# Patient Record
Sex: MALE | Race: WHITE | NOT HISPANIC OR LATINO
[De-identification: names, ages, dates, MRNs, and addresses within clinical notes are randomized per-mention and may not be internally consistent; named-entity substitution may affect disease eponyms.]

---

## 2017-04-30 ENCOUNTER — TRANSCRIPTION ENCOUNTER (OUTPATIENT)
Age: 37
End: 2017-04-30

## 2017-05-01 ENCOUNTER — APPOINTMENT (OUTPATIENT)
Dept: ORTHOPEDIC SURGERY | Facility: CLINIC | Age: 37
End: 2017-05-01

## 2017-05-01 VITALS
SYSTOLIC BLOOD PRESSURE: 146 MMHG | BODY MASS INDEX: 34.07 KG/M2 | DIASTOLIC BLOOD PRESSURE: 98 MMHG | HEIGHT: 69 IN | HEART RATE: 98 BPM | WEIGHT: 230 LBS

## 2017-05-01 DIAGNOSIS — Z78.9 OTHER SPECIFIED HEALTH STATUS: ICD-10-CM

## 2017-05-01 DIAGNOSIS — L03.114 CELLULITIS OF LEFT UPPER LIMB: ICD-10-CM

## 2017-05-01 DIAGNOSIS — Z80.3 FAMILY HISTORY OF MALIGNANT NEOPLASM OF BREAST: ICD-10-CM

## 2017-05-03 PROBLEM — Z78.9 CURRENT NON-SMOKER: Status: ACTIVE | Noted: 2017-05-01

## 2017-05-03 PROBLEM — Z78.9 DOES NOT USE ILLICIT DRUGS: Status: ACTIVE | Noted: 2017-05-01

## 2017-05-03 PROBLEM — Z78.9 RARELY CONSUMES ALCOHOL: Status: ACTIVE | Noted: 2017-05-01

## 2017-05-03 PROBLEM — Z80.3 FAMILY HISTORY OF MALIGNANT NEOPLASM OF BREAST: Status: ACTIVE | Noted: 2017-05-01

## 2017-06-06 ENCOUNTER — TRANSCRIPTION ENCOUNTER (OUTPATIENT)
Age: 37
End: 2017-06-06

## 2017-10-08 ENCOUNTER — TRANSCRIPTION ENCOUNTER (OUTPATIENT)
Age: 37
End: 2017-10-08

## 2017-10-26 ENCOUNTER — TRANSCRIPTION ENCOUNTER (OUTPATIENT)
Age: 37
End: 2017-10-26

## 2017-11-08 ENCOUNTER — TRANSCRIPTION ENCOUNTER (OUTPATIENT)
Age: 37
End: 2017-11-08

## 2017-12-05 ENCOUNTER — TRANSCRIPTION ENCOUNTER (OUTPATIENT)
Age: 37
End: 2017-12-05

## 2017-12-26 ENCOUNTER — TRANSCRIPTION ENCOUNTER (OUTPATIENT)
Age: 37
End: 2017-12-26

## 2018-07-10 ENCOUNTER — APPOINTMENT (OUTPATIENT)
Dept: DERMATOLOGY | Facility: CLINIC | Age: 38
End: 2018-07-10
Payer: COMMERCIAL

## 2018-07-10 VITALS
HEIGHT: 69 IN | SYSTOLIC BLOOD PRESSURE: 130 MMHG | DIASTOLIC BLOOD PRESSURE: 70 MMHG | BODY MASS INDEX: 34.07 KG/M2 | WEIGHT: 230 LBS

## 2018-07-10 DIAGNOSIS — Z86.59 PERSONAL HISTORY OF OTHER MENTAL AND BEHAVIORAL DISORDERS: ICD-10-CM

## 2018-07-10 PROCEDURE — 99204 OFFICE O/P NEW MOD 45 MIN: CPT

## 2018-09-04 ENCOUNTER — APPOINTMENT (OUTPATIENT)
Dept: DERMATOLOGY | Facility: CLINIC | Age: 38
End: 2018-09-04
Payer: COMMERCIAL

## 2018-09-04 VITALS
HEIGHT: 69 IN | WEIGHT: 230 LBS | SYSTOLIC BLOOD PRESSURE: 130 MMHG | BODY MASS INDEX: 34.07 KG/M2 | DIASTOLIC BLOOD PRESSURE: 80 MMHG

## 2018-09-04 DIAGNOSIS — B35.4 TINEA CORPORIS: ICD-10-CM

## 2018-09-04 DIAGNOSIS — L21.9 SEBORRHEIC DERMATITIS, UNSPECIFIED: ICD-10-CM

## 2018-09-04 PROCEDURE — 99213 OFFICE O/P EST LOW 20 MIN: CPT

## 2018-09-16 ENCOUNTER — TRANSCRIPTION ENCOUNTER (OUTPATIENT)
Age: 38
End: 2018-09-16

## 2018-09-17 ENCOUNTER — APPOINTMENT (OUTPATIENT)
Dept: FAMILY MEDICINE | Facility: CLINIC | Age: 38
End: 2018-09-17
Payer: COMMERCIAL

## 2018-09-17 VITALS
DIASTOLIC BLOOD PRESSURE: 82 MMHG | BODY MASS INDEX: 35.59 KG/M2 | WEIGHT: 240.31 LBS | SYSTOLIC BLOOD PRESSURE: 110 MMHG | HEIGHT: 69 IN

## 2018-09-17 DIAGNOSIS — Z91.89 OTHER SPECIFIED PERSONAL RISK FACTORS, NOT ELSEWHERE CLASSIFIED: ICD-10-CM

## 2018-09-17 PROCEDURE — 99395 PREV VISIT EST AGE 18-39: CPT | Mod: 25

## 2018-09-17 PROCEDURE — 36415 COLL VENOUS BLD VENIPUNCTURE: CPT

## 2018-09-18 LAB
25(OH)D3 SERPL-MCNC: 25.5 NG/ML
ALBUMIN SERPL ELPH-MCNC: 4.6 G/DL
ALP BLD-CCNC: 105 U/L
ALT SERPL-CCNC: 85 U/L
ANION GAP SERPL CALC-SCNC: 12 MMOL/L
AST SERPL-CCNC: 47 U/L
BASOPHILS # BLD AUTO: 0.02 K/UL
BASOPHILS NFR BLD AUTO: 0.3 %
BILIRUB SERPL-MCNC: 0.8 MG/DL
BUN SERPL-MCNC: 12 MG/DL
CALCIUM SERPL-MCNC: 9.7 MG/DL
CHLORIDE SERPL-SCNC: 101 MMOL/L
CHOLEST SERPL-MCNC: 157 MG/DL
CHOLEST/HDLC SERPL: 4.4 RATIO
CO2 SERPL-SCNC: 28 MMOL/L
CREAT SERPL-MCNC: 1.08 MG/DL
EOSINOPHIL # BLD AUTO: 0.12 K/UL
EOSINOPHIL NFR BLD AUTO: 2.1 %
GLUCOSE SERPL-MCNC: 94 MG/DL
HBA1C MFR BLD HPLC: 5.1 %
HCT VFR BLD CALC: 47.8 %
HDLC SERPL-MCNC: 36 MG/DL
HGB BLD-MCNC: 16.8 G/DL
IMM GRANULOCYTES NFR BLD AUTO: 0.2 %
INSULIN SERPL-MCNC: 29.7 UU/ML
LDLC SERPL CALC-MCNC: 80 MG/DL
LYMPHOCYTES # BLD AUTO: 1.89 K/UL
LYMPHOCYTES NFR BLD AUTO: 32.5 %
MAN DIFF?: NORMAL
MCHC RBC-ENTMCNC: 31.4 PG
MCHC RBC-ENTMCNC: 35.1 GM/DL
MCV RBC AUTO: 89.3 FL
MONOCYTES # BLD AUTO: 0.47 K/UL
MONOCYTES NFR BLD AUTO: 8.1 %
NEUTROPHILS # BLD AUTO: 3.31 K/UL
NEUTROPHILS NFR BLD AUTO: 56.8 %
PLATELET # BLD AUTO: 233 K/UL
POTASSIUM SERPL-SCNC: 4.4 MMOL/L
PROT SERPL-MCNC: 7.6 G/DL
PSA SERPL-MCNC: 1.23 NG/ML
RBC # BLD: 5.35 M/UL
RBC # FLD: 13 %
SODIUM SERPL-SCNC: 141 MMOL/L
TRIGL SERPL-MCNC: 207 MG/DL
TSH SERPL-ACNC: 1.31 UIU/ML
WBC # FLD AUTO: 5.82 K/UL

## 2018-09-19 ENCOUNTER — TRANSCRIPTION ENCOUNTER (OUTPATIENT)
Age: 38
End: 2018-09-19

## 2018-09-19 LAB
APPEARANCE: CLEAR
BACTERIA: NEGATIVE
BILIRUBIN URINE: NEGATIVE
BLOOD URINE: NEGATIVE
COLOR: YELLOW
GLUCOSE QUALITATIVE U: NEGATIVE MG/DL
KETONES URINE: NEGATIVE
LEUKOCYTE ESTERASE URINE: NEGATIVE
MICROSCOPIC-UA: NORMAL
NITRITE URINE: NEGATIVE
PH URINE: 6.5
PROTEIN URINE: NEGATIVE MG/DL
RED BLOOD CELLS URINE: 3 /HPF
SPECIFIC GRAVITY URINE: 1.02
SQUAMOUS EPITHELIAL CELLS: 0 /HPF
UROBILINOGEN URINE: NEGATIVE MG/DL
WHITE BLOOD CELLS URINE: 0 /HPF

## 2018-09-20 ENCOUNTER — TRANSCRIPTION ENCOUNTER (OUTPATIENT)
Age: 38
End: 2018-09-20

## 2018-11-26 ENCOUNTER — TRANSCRIPTION ENCOUNTER (OUTPATIENT)
Age: 38
End: 2018-11-26

## 2019-02-14 ENCOUNTER — APPOINTMENT (OUTPATIENT)
Dept: FAMILY MEDICINE | Facility: CLINIC | Age: 39
End: 2019-02-14
Payer: COMMERCIAL

## 2019-02-14 VITALS
BODY MASS INDEX: 32.35 KG/M2 | SYSTOLIC BLOOD PRESSURE: 110 MMHG | HEART RATE: 69 BPM | OXYGEN SATURATION: 98 % | HEIGHT: 69 IN | WEIGHT: 218.38 LBS | DIASTOLIC BLOOD PRESSURE: 80 MMHG

## 2019-02-14 DIAGNOSIS — Z86.39 PERSONAL HISTORY OF OTHER ENDOCRINE, NUTRITIONAL AND METABOLIC DISEASE: ICD-10-CM

## 2019-02-14 PROCEDURE — 36415 COLL VENOUS BLD VENIPUNCTURE: CPT

## 2019-02-14 PROCEDURE — 99214 OFFICE O/P EST MOD 30 MIN: CPT | Mod: 25

## 2019-02-14 RX ORDER — CEPHALEXIN 500 MG/1
500 CAPSULE ORAL
Qty: 28 | Refills: 0 | Status: DISCONTINUED | COMMUNITY
Start: 2017-04-29 | End: 2019-02-14

## 2019-02-14 RX ORDER — KETOCONAZOLE 20 MG/G
2 CREAM TOPICAL
Qty: 1 | Refills: 3 | Status: DISCONTINUED | COMMUNITY
Start: 2018-07-10 | End: 2019-02-14

## 2019-02-14 RX ORDER — KETOCONAZOLE 20.5 MG/ML
2 SHAMPOO, SUSPENSION TOPICAL
Qty: 1 | Refills: 2 | Status: DISCONTINUED | COMMUNITY
Start: 2018-07-10 | End: 2019-02-14

## 2019-02-15 LAB
ALBUMIN SERPL ELPH-MCNC: 4.7 G/DL
ALP BLD-CCNC: 109 U/L
ALT SERPL-CCNC: 40 U/L
ANION GAP SERPL CALC-SCNC: 11 MMOL/L
AST SERPL-CCNC: 29 U/L
BASOPHILS # BLD AUTO: 0.01 K/UL
BASOPHILS NFR BLD AUTO: 0.2 %
BILIRUB SERPL-MCNC: 0.8 MG/DL
BUN SERPL-MCNC: 16 MG/DL
CALCIUM SERPL-MCNC: 9.4 MG/DL
CHLORIDE SERPL-SCNC: 103 MMOL/L
CHOLEST SERPL-MCNC: 154 MG/DL
CHOLEST/HDLC SERPL: 3.9 RATIO
CO2 SERPL-SCNC: 27 MMOL/L
CREAT SERPL-MCNC: 1.12 MG/DL
EOSINOPHIL # BLD AUTO: 0.12 K/UL
EOSINOPHIL NFR BLD AUTO: 1.9 %
GLUCOSE SERPL-MCNC: 98 MG/DL
HCT VFR BLD CALC: 47.2 %
HDLC SERPL-MCNC: 40 MG/DL
HGB BLD-MCNC: 16.3 G/DL
IMM GRANULOCYTES NFR BLD AUTO: 0.2 %
INSULIN SERPL-MCNC: 19.3 UU/ML
LDLC SERPL CALC-MCNC: 91 MG/DL
LYMPHOCYTES # BLD AUTO: 2.05 K/UL
LYMPHOCYTES NFR BLD AUTO: 33 %
MAN DIFF?: NORMAL
MCHC RBC-ENTMCNC: 31.8 PG
MCHC RBC-ENTMCNC: 34.5 GM/DL
MCV RBC AUTO: 92 FL
MONOCYTES # BLD AUTO: 0.41 K/UL
MONOCYTES NFR BLD AUTO: 6.6 %
NEUTROPHILS # BLD AUTO: 3.61 K/UL
NEUTROPHILS NFR BLD AUTO: 58.1 %
PLATELET # BLD AUTO: 246 K/UL
POTASSIUM SERPL-SCNC: 4.2 MMOL/L
PROT SERPL-MCNC: 7.3 G/DL
RBC # BLD: 5.13 M/UL
RBC # FLD: 13.2 %
SODIUM SERPL-SCNC: 141 MMOL/L
TRIGL SERPL-MCNC: 116 MG/DL
TSH SERPL-ACNC: 1.27 UIU/ML
WBC # FLD AUTO: 6.21 K/UL

## 2019-03-15 ENCOUNTER — TRANSCRIPTION ENCOUNTER (OUTPATIENT)
Age: 39
End: 2019-03-15

## 2019-04-17 ENCOUNTER — APPOINTMENT (OUTPATIENT)
Dept: SURGERY | Facility: CLINIC | Age: 39
End: 2019-04-17
Payer: COMMERCIAL

## 2019-04-17 VITALS
OXYGEN SATURATION: 99 % | DIASTOLIC BLOOD PRESSURE: 100 MMHG | BODY MASS INDEX: 32.58 KG/M2 | HEIGHT: 69 IN | SYSTOLIC BLOOD PRESSURE: 151 MMHG | HEART RATE: 76 BPM | TEMPERATURE: 97.9 F | WEIGHT: 220 LBS

## 2019-04-17 PROCEDURE — 45300 PROCTOSIGMOIDOSCOPY DX: CPT

## 2019-04-17 PROCEDURE — 99203 OFFICE O/P NEW LOW 30 MIN: CPT | Mod: 25

## 2019-04-21 ENCOUNTER — TRANSCRIPTION ENCOUNTER (OUTPATIENT)
Age: 39
End: 2019-04-21

## 2019-04-24 ENCOUNTER — TRANSCRIPTION ENCOUNTER (OUTPATIENT)
Age: 39
End: 2019-04-24

## 2019-06-12 ENCOUNTER — TRANSCRIPTION ENCOUNTER (OUTPATIENT)
Age: 39
End: 2019-06-12

## 2019-07-18 ENCOUNTER — RX RENEWAL (OUTPATIENT)
Age: 39
End: 2019-07-18

## 2019-07-25 ENCOUNTER — TRANSCRIPTION ENCOUNTER (OUTPATIENT)
Age: 39
End: 2019-07-25

## 2019-11-11 ENCOUNTER — APPOINTMENT (OUTPATIENT)
Dept: FAMILY MEDICINE | Facility: CLINIC | Age: 39
End: 2019-11-11
Payer: COMMERCIAL

## 2019-11-11 VITALS
SYSTOLIC BLOOD PRESSURE: 134 MMHG | WEIGHT: 226 LBS | OXYGEN SATURATION: 96 % | HEART RATE: 71 BPM | DIASTOLIC BLOOD PRESSURE: 100 MMHG | HEIGHT: 69 IN | TEMPERATURE: 98 F | BODY MASS INDEX: 33.47 KG/M2

## 2019-11-11 VITALS — SYSTOLIC BLOOD PRESSURE: 130 MMHG | DIASTOLIC BLOOD PRESSURE: 90 MMHG

## 2019-11-11 PROCEDURE — 99395 PREV VISIT EST AGE 18-39: CPT | Mod: 25

## 2019-11-11 PROCEDURE — 36415 COLL VENOUS BLD VENIPUNCTURE: CPT

## 2019-11-11 RX ORDER — METFORMIN HYDROCHLORIDE 500 MG/1
500 TABLET, FILM COATED, EXTENDED RELEASE ORAL
Qty: 90 | Refills: 0 | Status: DISCONTINUED | COMMUNITY
Start: 2018-09-18 | End: 2019-11-11

## 2019-11-12 LAB
25(OH)D3 SERPL-MCNC: 24.4 NG/ML
ALBUMIN SERPL ELPH-MCNC: 4.8 G/DL
ALP BLD-CCNC: 102 U/L
ALT SERPL-CCNC: 47 U/L
ANION GAP SERPL CALC-SCNC: 14 MMOL/L
APPEARANCE: CLEAR
AST SERPL-CCNC: 35 U/L
BACTERIA: NEGATIVE
BASOPHILS # BLD AUTO: 0.03 K/UL
BASOPHILS NFR BLD AUTO: 0.6 %
BILIRUB SERPL-MCNC: 0.8 MG/DL
BILIRUBIN URINE: NEGATIVE
BLOOD URINE: NEGATIVE
BUN SERPL-MCNC: 10 MG/DL
CALCIUM SERPL-MCNC: 9.6 MG/DL
CHLORIDE SERPL-SCNC: 102 MMOL/L
CHOLEST SERPL-MCNC: 172 MG/DL
CHOLEST/HDLC SERPL: 3.7 RATIO
CO2 SERPL-SCNC: 25 MMOL/L
COLOR: YELLOW
CREAT SERPL-MCNC: 0.96 MG/DL
EOSINOPHIL # BLD AUTO: 0.06 K/UL
EOSINOPHIL NFR BLD AUTO: 1.1 %
ESTIMATED AVERAGE GLUCOSE: 91 MG/DL
GLUCOSE QUALITATIVE U: NEGATIVE
GLUCOSE SERPL-MCNC: 94 MG/DL
HBA1C MFR BLD HPLC: 4.8 %
HCT VFR BLD CALC: 47.4 %
HDLC SERPL-MCNC: 46 MG/DL
HGB BLD-MCNC: 16.6 G/DL
HYALINE CASTS: 0 /LPF
IMM GRANULOCYTES NFR BLD AUTO: 0.2 %
INSULIN SERPL-MCNC: 14.5 UU/ML
KETONES URINE: NEGATIVE
LDLC SERPL CALC-MCNC: 94 MG/DL
LEUKOCYTE ESTERASE URINE: NEGATIVE
LYMPHOCYTES # BLD AUTO: 1.84 K/UL
LYMPHOCYTES NFR BLD AUTO: 35.1 %
MAN DIFF?: NORMAL
MCHC RBC-ENTMCNC: 32 PG
MCHC RBC-ENTMCNC: 35 GM/DL
MCV RBC AUTO: 91.5 FL
MICROSCOPIC-UA: NORMAL
MONOCYTES # BLD AUTO: 0.39 K/UL
MONOCYTES NFR BLD AUTO: 7.4 %
NEUTROPHILS # BLD AUTO: 2.91 K/UL
NEUTROPHILS NFR BLD AUTO: 55.6 %
NITRITE URINE: NEGATIVE
PH URINE: 6.5
PLATELET # BLD AUTO: 228 K/UL
POTASSIUM SERPL-SCNC: 4.3 MMOL/L
PROT SERPL-MCNC: 7.5 G/DL
PROTEIN URINE: NEGATIVE
PSA SERPL-MCNC: 0.92 NG/ML
RBC # BLD: 5.18 M/UL
RBC # FLD: 12.4 %
RED BLOOD CELLS URINE: 2 /HPF
SODIUM SERPL-SCNC: 141 MMOL/L
SPECIFIC GRAVITY URINE: 1.02
SQUAMOUS EPITHELIAL CELLS: 1 /HPF
TRIGL SERPL-MCNC: 160 MG/DL
TSH SERPL-ACNC: 1.77 UIU/ML
UROBILINOGEN URINE: NORMAL
WBC # FLD AUTO: 5.24 K/UL
WHITE BLOOD CELLS URINE: 0 /HPF

## 2019-11-15 ENCOUNTER — TRANSCRIPTION ENCOUNTER (OUTPATIENT)
Age: 39
End: 2019-11-15

## 2020-02-13 ENCOUNTER — TRANSCRIPTION ENCOUNTER (OUTPATIENT)
Age: 40
End: 2020-02-13

## 2020-04-25 ENCOUNTER — MESSAGE (OUTPATIENT)
Age: 40
End: 2020-04-25

## 2020-05-17 ENCOUNTER — TRANSCRIPTION ENCOUNTER (OUTPATIENT)
Age: 40
End: 2020-05-17

## 2020-06-17 ENCOUNTER — RX RENEWAL (OUTPATIENT)
Age: 40
End: 2020-06-17

## 2020-07-07 ENCOUNTER — APPOINTMENT (OUTPATIENT)
Dept: FAMILY MEDICINE | Facility: CLINIC | Age: 40
End: 2020-07-07
Payer: COMMERCIAL

## 2020-07-07 VITALS
WEIGHT: 232 LBS | RESPIRATION RATE: 16 BRPM | BODY MASS INDEX: 34.26 KG/M2 | HEART RATE: 73 BPM | OXYGEN SATURATION: 98 % | DIASTOLIC BLOOD PRESSURE: 98 MMHG | SYSTOLIC BLOOD PRESSURE: 122 MMHG

## 2020-07-07 VITALS — SYSTOLIC BLOOD PRESSURE: 120 MMHG | DIASTOLIC BLOOD PRESSURE: 90 MMHG

## 2020-07-07 DIAGNOSIS — Z11.59 ENCOUNTER FOR SCREENING FOR OTHER VIRAL DISEASES: ICD-10-CM

## 2020-07-07 PROCEDURE — 36415 COLL VENOUS BLD VENIPUNCTURE: CPT

## 2020-07-07 PROCEDURE — 99395 PREV VISIT EST AGE 18-39: CPT | Mod: 25

## 2020-07-08 LAB
ALBUMIN SERPL ELPH-MCNC: 4.7 G/DL
ALP BLD-CCNC: 93 U/L
ALT SERPL-CCNC: 97 U/L
ANION GAP SERPL CALC-SCNC: 13 MMOL/L
APPEARANCE: CLEAR
AST SERPL-CCNC: 48 U/L
BACTERIA: NEGATIVE
BASOPHILS # BLD AUTO: 0.03 K/UL
BASOPHILS NFR BLD AUTO: 0.5 %
BILIRUB SERPL-MCNC: 0.9 MG/DL
BILIRUBIN URINE: NEGATIVE
BLOOD URINE: NEGATIVE
BUN SERPL-MCNC: 15 MG/DL
CALCIUM SERPL-MCNC: 9.5 MG/DL
CHLORIDE SERPL-SCNC: 102 MMOL/L
CHOLEST SERPL-MCNC: 197 MG/DL
CHOLEST/HDLC SERPL: 5.2 RATIO
CO2 SERPL-SCNC: 25 MMOL/L
COLOR: YELLOW
CREAT SERPL-MCNC: 0.99 MG/DL
EOSINOPHIL # BLD AUTO: 0.14 K/UL
EOSINOPHIL NFR BLD AUTO: 2.5 %
GLUCOSE QUALITATIVE U: NEGATIVE
GLUCOSE SERPL-MCNC: 98 MG/DL
HCT VFR BLD CALC: 49.4 %
HDLC SERPL-MCNC: 38 MG/DL
HGB BLD-MCNC: 16.8 G/DL
HYALINE CASTS: 0 /LPF
IMM GRANULOCYTES NFR BLD AUTO: 0.2 %
INSULIN SERPL-MCNC: 25.1 UU/ML
KETONES URINE: NEGATIVE
LDLC SERPL CALC-MCNC: 95 MG/DL
LEUKOCYTE ESTERASE URINE: NEGATIVE
LYMPHOCYTES # BLD AUTO: 2.02 K/UL
LYMPHOCYTES NFR BLD AUTO: 36.3 %
MAN DIFF?: NORMAL
MCHC RBC-ENTMCNC: 31.5 PG
MCHC RBC-ENTMCNC: 34 GM/DL
MCV RBC AUTO: 92.5 FL
MICROSCOPIC-UA: NORMAL
MONOCYTES # BLD AUTO: 0.45 K/UL
MONOCYTES NFR BLD AUTO: 8.1 %
NEUTROPHILS # BLD AUTO: 2.92 K/UL
NEUTROPHILS NFR BLD AUTO: 52.4 %
NITRITE URINE: NEGATIVE
PH URINE: 6
PLATELET # BLD AUTO: 224 K/UL
POTASSIUM SERPL-SCNC: 4 MMOL/L
PROT SERPL-MCNC: 7.5 G/DL
PROTEIN URINE: NEGATIVE
RBC # BLD: 5.34 M/UL
RBC # FLD: 12.6 %
RED BLOOD CELLS URINE: 3 /HPF
SARS-COV-2 IGG SERPL IA-ACNC: <3.8 AU/ML
SARS-COV-2 IGG SERPL QL IA: NEGATIVE
SODIUM SERPL-SCNC: 139 MMOL/L
SPECIFIC GRAVITY URINE: 1.03
SQUAMOUS EPITHELIAL CELLS: 0 /HPF
TRIGL SERPL-MCNC: 319 MG/DL
TSH SERPL-ACNC: 1.47 UIU/ML
UROBILINOGEN URINE: NORMAL
WBC # FLD AUTO: 5.57 K/UL
WHITE BLOOD CELLS URINE: 1 /HPF

## 2020-09-22 ENCOUNTER — RX RENEWAL (OUTPATIENT)
Age: 40
End: 2020-09-22

## 2020-12-01 ENCOUNTER — TRANSCRIPTION ENCOUNTER (OUTPATIENT)
Age: 40
End: 2020-12-01

## 2021-01-14 ENCOUNTER — RX RENEWAL (OUTPATIENT)
Age: 41
End: 2021-01-14

## 2021-01-31 ENCOUNTER — APPOINTMENT (OUTPATIENT)
Dept: FAMILY MEDICINE | Facility: CLINIC | Age: 41
End: 2021-01-31
Payer: COMMERCIAL

## 2021-01-31 ENCOUNTER — LABORATORY RESULT (OUTPATIENT)
Age: 41
End: 2021-01-31

## 2021-01-31 VITALS
WEIGHT: 239 LBS | BODY MASS INDEX: 35.29 KG/M2 | DIASTOLIC BLOOD PRESSURE: 70 MMHG | OXYGEN SATURATION: 97 % | SYSTOLIC BLOOD PRESSURE: 120 MMHG | HEART RATE: 79 BPM

## 2021-01-31 PROCEDURE — 36415 COLL VENOUS BLD VENIPUNCTURE: CPT

## 2021-01-31 PROCEDURE — 99072 ADDL SUPL MATRL&STAF TM PHE: CPT

## 2021-01-31 PROCEDURE — 99214 OFFICE O/P EST MOD 30 MIN: CPT | Mod: 25

## 2021-02-01 LAB
ALBUMIN SERPL ELPH-MCNC: 4.6 G/DL
ALP BLD-CCNC: 104 U/L
ALT SERPL-CCNC: 110 U/L
ANION GAP SERPL CALC-SCNC: 13 MMOL/L
AST SERPL-CCNC: 48 U/L
BASOPHILS # BLD AUTO: 0.04 K/UL
BASOPHILS NFR BLD AUTO: 0.7 %
BILIRUB SERPL-MCNC: 0.8 MG/DL
BUN SERPL-MCNC: 15 MG/DL
CALCIUM SERPL-MCNC: 9.7 MG/DL
CHLORIDE SERPL-SCNC: 103 MMOL/L
CO2 SERPL-SCNC: 25 MMOL/L
CREAT SERPL-MCNC: 1.15 MG/DL
EOSINOPHIL # BLD AUTO: 0.13 K/UL
EOSINOPHIL NFR BLD AUTO: 2.3 %
ESTIMATED AVERAGE GLUCOSE: 94 MG/DL
GLUCOSE SERPL-MCNC: 93 MG/DL
HBA1C MFR BLD HPLC: 4.9 %
HCT VFR BLD CALC: 46.9 %
HGB BLD-MCNC: 16.4 G/DL
IMM GRANULOCYTES NFR BLD AUTO: 0.2 %
LYMPHOCYTES # BLD AUTO: 2.22 K/UL
LYMPHOCYTES NFR BLD AUTO: 39.4 %
MAN DIFF?: NORMAL
MCHC RBC-ENTMCNC: 31.3 PG
MCHC RBC-ENTMCNC: 35 GM/DL
MCV RBC AUTO: 89.5 FL
MONOCYTES # BLD AUTO: 0.43 K/UL
MONOCYTES NFR BLD AUTO: 7.6 %
NEUTROPHILS # BLD AUTO: 2.8 K/UL
NEUTROPHILS NFR BLD AUTO: 49.8 %
PLATELET # BLD AUTO: 259 K/UL
POTASSIUM SERPL-SCNC: 4.3 MMOL/L
PROT SERPL-MCNC: 7.3 G/DL
RBC # BLD: 5.24 M/UL
RBC # FLD: 12.4 %
SODIUM SERPL-SCNC: 141 MMOL/L
TSH SERPL-ACNC: 1.32 UIU/ML
WBC # FLD AUTO: 5.63 K/UL

## 2021-02-17 RX ORDER — METFORMIN HYDROCHLORIDE 1000 MG/1
1000 TABLET, FILM COATED, EXTENDED RELEASE ORAL
Qty: 90 | Refills: 0 | Status: DISCONTINUED | COMMUNITY
Start: 2019-07-18 | End: 2021-02-17

## 2021-02-22 ENCOUNTER — RX RENEWAL (OUTPATIENT)
Age: 41
End: 2021-02-22

## 2021-07-23 ENCOUNTER — APPOINTMENT (OUTPATIENT)
Dept: DERMATOLOGY | Facility: CLINIC | Age: 41
End: 2021-07-23
Payer: COMMERCIAL

## 2021-07-23 VITALS — BODY MASS INDEX: 33.33 KG/M2 | WEIGHT: 225 LBS | HEIGHT: 69 IN

## 2021-07-23 DIAGNOSIS — Z12.83 ENCOUNTER FOR SCREENING FOR MALIGNANT NEOPLASM OF SKIN: ICD-10-CM

## 2021-07-23 DIAGNOSIS — L21.9 SEBORRHEIC DERMATITIS, UNSPECIFIED: ICD-10-CM

## 2021-07-23 DIAGNOSIS — D22.9 MELANOCYTIC NEVI, UNSPECIFIED: ICD-10-CM

## 2021-07-23 DIAGNOSIS — L71.9 ROSACEA, UNSPECIFIED: ICD-10-CM

## 2021-07-23 PROCEDURE — 99214 OFFICE O/P EST MOD 30 MIN: CPT

## 2021-07-23 PROCEDURE — 99072 ADDL SUPL MATRL&STAF TM PHE: CPT

## 2021-07-23 RX ORDER — FLUOCINOLONE ACETONIDE 0.1 MG/ML
0.01 SOLUTION TOPICAL
Qty: 1 | Refills: 2 | Status: ACTIVE | COMMUNITY
Start: 2021-07-23 | End: 1900-01-01

## 2021-07-23 RX ORDER — KETOCONAZOLE 20.5 MG/ML
2 SHAMPOO, SUSPENSION TOPICAL
Qty: 1 | Refills: 11 | Status: ACTIVE | COMMUNITY
Start: 2021-07-23 | End: 1900-01-01

## 2021-07-23 RX ORDER — METRONIDAZOLE 7.5 MG/G
0.75 CREAM TOPICAL
Qty: 1 | Refills: 2 | Status: ACTIVE | COMMUNITY
Start: 2018-07-10 | End: 1900-01-01

## 2021-08-05 ENCOUNTER — RX RENEWAL (OUTPATIENT)
Age: 41
End: 2021-08-05

## 2021-10-19 ENCOUNTER — RX RENEWAL (OUTPATIENT)
Age: 41
End: 2021-10-19

## 2021-11-01 ENCOUNTER — RX RENEWAL (OUTPATIENT)
Age: 41
End: 2021-11-01

## 2021-11-20 ENCOUNTER — RX RENEWAL (OUTPATIENT)
Age: 41
End: 2021-11-20

## 2021-12-02 ENCOUNTER — APPOINTMENT (OUTPATIENT)
Dept: FAMILY MEDICINE | Facility: CLINIC | Age: 41
End: 2021-12-02
Payer: COMMERCIAL

## 2021-12-02 VITALS
BODY MASS INDEX: 31.99 KG/M2 | HEIGHT: 69 IN | TEMPERATURE: 98.2 F | OXYGEN SATURATION: 98 % | HEART RATE: 110 BPM | WEIGHT: 216 LBS | DIASTOLIC BLOOD PRESSURE: 86 MMHG | SYSTOLIC BLOOD PRESSURE: 132 MMHG

## 2021-12-02 DIAGNOSIS — Z23 ENCOUNTER FOR IMMUNIZATION: ICD-10-CM

## 2021-12-02 PROCEDURE — 99395 PREV VISIT EST AGE 18-39: CPT | Mod: 25

## 2021-12-02 PROCEDURE — G0008: CPT

## 2021-12-02 PROCEDURE — 36415 COLL VENOUS BLD VENIPUNCTURE: CPT

## 2021-12-02 PROCEDURE — 90686 IIV4 VACC NO PRSV 0.5 ML IM: CPT

## 2021-12-03 ENCOUNTER — TRANSCRIPTION ENCOUNTER (OUTPATIENT)
Age: 41
End: 2021-12-03

## 2021-12-03 LAB
25(OH)D3 SERPL-MCNC: 19.6 NG/ML
ALBUMIN SERPL ELPH-MCNC: 4.7 G/DL
ALP BLD-CCNC: 103 U/L
ALT SERPL-CCNC: 38 U/L
ANION GAP SERPL CALC-SCNC: 12 MMOL/L
APPEARANCE: CLEAR
AST SERPL-CCNC: 27 U/L
BACTERIA: NEGATIVE
BASOPHILS # BLD AUTO: 0.02 K/UL
BASOPHILS NFR BLD AUTO: 0.4 %
BILIRUB SERPL-MCNC: 0.7 MG/DL
BILIRUBIN URINE: NEGATIVE
BLOOD URINE: NEGATIVE
BUN SERPL-MCNC: 16 MG/DL
CALCIUM SERPL-MCNC: 9.9 MG/DL
CHLORIDE SERPL-SCNC: 104 MMOL/L
CHOLEST SERPL-MCNC: 165 MG/DL
CO2 SERPL-SCNC: 26 MMOL/L
COLOR: YELLOW
CREAT SERPL-MCNC: 1.05 MG/DL
EOSINOPHIL # BLD AUTO: 0.12 K/UL
EOSINOPHIL NFR BLD AUTO: 2.2 %
ESTIMATED AVERAGE GLUCOSE: 91 MG/DL
GLUCOSE QUALITATIVE U: NEGATIVE
GLUCOSE SERPL-MCNC: 88 MG/DL
HBA1C MFR BLD HPLC: 4.8 %
HCT VFR BLD CALC: 47.4 %
HDLC SERPL-MCNC: 43 MG/DL
HGB BLD-MCNC: 16.6 G/DL
HYALINE CASTS: 1 /LPF
IMM GRANULOCYTES NFR BLD AUTO: 0.4 %
KETONES URINE: NEGATIVE
LDLC SERPL CALC-MCNC: 86 MG/DL
LEUKOCYTE ESTERASE URINE: NEGATIVE
LYMPHOCYTES # BLD AUTO: 1.93 K/UL
LYMPHOCYTES NFR BLD AUTO: 35.6 %
MAN DIFF?: NORMAL
MCHC RBC-ENTMCNC: 32 PG
MCHC RBC-ENTMCNC: 35 GM/DL
MCV RBC AUTO: 91.3 FL
MICROSCOPIC-UA: NORMAL
MONOCYTES # BLD AUTO: 0.41 K/UL
MONOCYTES NFR BLD AUTO: 7.6 %
NEUTROPHILS # BLD AUTO: 2.92 K/UL
NEUTROPHILS NFR BLD AUTO: 53.8 %
NITRITE URINE: NEGATIVE
NONHDLC SERPL-MCNC: 122 MG/DL
PH URINE: 6
PLATELET # BLD AUTO: 245 K/UL
POTASSIUM SERPL-SCNC: 4.1 MMOL/L
PROT SERPL-MCNC: 7.2 G/DL
PROTEIN URINE: NEGATIVE
RBC # BLD: 5.19 M/UL
RBC # FLD: 12.7 %
RED BLOOD CELLS URINE: 2 /HPF
SODIUM SERPL-SCNC: 142 MMOL/L
SPECIFIC GRAVITY URINE: 1.03
SQUAMOUS EPITHELIAL CELLS: 0 /HPF
TRIGL SERPL-MCNC: 181 MG/DL
TSH SERPL-ACNC: 1.33 UIU/ML
UROBILINOGEN URINE: NORMAL
WBC # FLD AUTO: 5.42 K/UL
WHITE BLOOD CELLS URINE: 1 /HPF

## 2022-02-19 ENCOUNTER — RX RENEWAL (OUTPATIENT)
Age: 42
End: 2022-02-19

## 2022-04-23 ENCOUNTER — TRANSCRIPTION ENCOUNTER (OUTPATIENT)
Age: 42
End: 2022-04-23

## 2022-05-05 ENCOUNTER — RX RENEWAL (OUTPATIENT)
Age: 42
End: 2022-05-05

## 2022-05-07 ENCOUNTER — RX RENEWAL (OUTPATIENT)
Age: 42
End: 2022-05-07

## 2022-05-08 ENCOUNTER — RX RENEWAL (OUTPATIENT)
Age: 42
End: 2022-05-08

## 2022-05-13 ENCOUNTER — APPOINTMENT (OUTPATIENT)
Dept: UROLOGY | Facility: CLINIC | Age: 42
End: 2022-05-13
Payer: COMMERCIAL

## 2022-05-13 VITALS
BODY MASS INDEX: 31.99 KG/M2 | HEART RATE: 86 BPM | RESPIRATION RATE: 15 BRPM | OXYGEN SATURATION: 97 % | WEIGHT: 216 LBS | TEMPERATURE: 98.6 F | SYSTOLIC BLOOD PRESSURE: 156 MMHG | HEIGHT: 69 IN | DIASTOLIC BLOOD PRESSURE: 98 MMHG

## 2022-05-13 PROCEDURE — 99204 OFFICE O/P NEW MOD 45 MIN: CPT

## 2022-05-15 NOTE — ASSESSMENT
[FreeTextEntry1] : I counseled the patient extensively today with regard to vasectomy. I discussed the potential risks and benefits of the procedure with the patient including the potential for bleeding and infection. I also discussed the fact that this procedure should be considered irreversible and if there is any question in the patient's mind, I have encouraged him to reconsider his decision to move forward with the procedure. I also discussed the fact that he may remain temporarily fertile for a period of up to 3 months after undergoing the procedure and that a post vasectomy semen analysis would be required to confirm the absence of sperm in the ejaculate.\par \par I discussed options of performing the procedure either under local anesthesia in the office versus under a more general IV sedation in the operating room. He has decided to move forward with the procedure under local anesthesia and I will schedule that at the earliest available time.  During the visit today, the patient signed the initial consent form with 30-day lead time for the procedure understanding that if he does ultimately decide to change his mind regarding vasectomy, he can do so at any time.

## 2022-05-15 NOTE — HISTORY OF PRESENT ILLNESS
[FreeTextEntry1] : NINFA CAMPBELL is a 41 year M who presents today as a new patient evaluation for vasectomy evaluation.\par \par He has 2 children, ages 7 and 11.  No history of scrotal surgery, or scrotal pain.  He is , and is confident he does not want additional children in the future. Denies gross hematuria, flank pain, fevers, chills, nausea, vomiting. \par \par

## 2022-05-15 NOTE — PHYSICAL EXAM
[Normal Appearance] : normal appearance [Edema] : no peripheral edema [Abdomen Tenderness] : non-tender [FreeTextEntry1] : Bilateral descended testicles.  No testicular masses.  Loose scrotum, bilateral vasa palpated. [Normal Station and Gait] : the gait and station were normal for the patient's age [] : no rash [Sensation] : the sensory exam was normal to light touch and pinprick [Oriented To Time, Place, And Person] : oriented to person, place, and time [No Palpable Adenopathy] : no palpable adenopathy

## 2022-05-15 NOTE — LETTER BODY
[Dear  ___] : Dear  [unfilled], [Consult Letter:] : I had the pleasure of evaluating your patient, [unfilled]. [Please see my note below.] : Please see my note below. [Consult Closing:] : Thank you very much for allowing me to participate in the care of this patient.  If you have any questions, please do not hesitate to contact me. [Sincerely,] : Sincerely, [FreeTextEntry2] : Junior Ambrose MD\par [address] [place] [address] [FreeTextEntry3] : Rangel Jones MD\par [facility] [place]\par [address] [place] [address], [address]\par [place], [place]\par [address]\par p: [phone]\par f: [phone]

## 2022-06-07 ENCOUNTER — RX RENEWAL (OUTPATIENT)
Age: 42
End: 2022-06-07

## 2022-06-18 ENCOUNTER — NON-APPOINTMENT (OUTPATIENT)
Age: 42
End: 2022-06-18

## 2022-06-20 ENCOUNTER — APPOINTMENT (OUTPATIENT)
Dept: ORTHOPEDIC SURGERY | Facility: CLINIC | Age: 42
End: 2022-06-20
Payer: COMMERCIAL

## 2022-06-20 VITALS — HEIGHT: 69 IN | WEIGHT: 216 LBS | BODY MASS INDEX: 31.99 KG/M2

## 2022-06-20 PROCEDURE — 20550 NJX 1 TENDON SHEATH/LIGAMENT: CPT

## 2022-06-20 PROCEDURE — J3490M: CUSTOM

## 2022-06-20 PROCEDURE — 99214 OFFICE O/P EST MOD 30 MIN: CPT | Mod: 25

## 2022-06-21 NOTE — PHYSICAL EXAM
[de-identified] : R hand: \par Mild swelling \par Tender 4th A1 pulley \par Decreased ring ROM \par +ring triggering

## 2022-06-21 NOTE — HISTORY OF PRESENT ILLNESS
[5] : 5 [Radiating] : radiating [Sharp] : sharp [Shooting] : shooting [Intermittent] : intermittent [Full time] : Work status: full time [de-identified] : R RF trigger\par  [] : Post Surgical Visit: no [FreeTextEntry1] : Rt hand [FreeTextEntry3] : 6/18/22 [FreeTextEntry5] : patient was lifting a dumbbell at the gym, felt something funny in his hand, couldn't move his finger afterwards\par pain radiates up the arm\par  [de-identified] : movement

## 2022-06-22 ENCOUNTER — APPOINTMENT (OUTPATIENT)
Dept: UROLOGY | Facility: CLINIC | Age: 42
End: 2022-06-22
Payer: COMMERCIAL

## 2022-06-22 ENCOUNTER — LABORATORY RESULT (OUTPATIENT)
Age: 42
End: 2022-06-22

## 2022-06-22 VITALS
OXYGEN SATURATION: 98 % | TEMPERATURE: 97.6 F | HEIGHT: 69 IN | HEART RATE: 96 BPM | RESPIRATION RATE: 14 BRPM | WEIGHT: 216 LBS | DIASTOLIC BLOOD PRESSURE: 114 MMHG | SYSTOLIC BLOOD PRESSURE: 164 MMHG | BODY MASS INDEX: 31.99 KG/M2

## 2022-06-22 DIAGNOSIS — Z30.09 ENCOUNTER FOR OTHER GENERAL COUNSELING AND ADVICE ON CONTRACEPTION: ICD-10-CM

## 2022-06-22 PROCEDURE — 55250 REMOVAL OF SPERM DUCT(S): CPT

## 2022-07-18 ENCOUNTER — RX RENEWAL (OUTPATIENT)
Age: 42
End: 2022-07-18

## 2022-08-17 ENCOUNTER — NON-APPOINTMENT (OUTPATIENT)
Age: 42
End: 2022-08-17

## 2022-08-31 ENCOUNTER — TRANSCRIPTION ENCOUNTER (OUTPATIENT)
Age: 42
End: 2022-08-31

## 2022-09-06 ENCOUNTER — NON-APPOINTMENT (OUTPATIENT)
Age: 42
End: 2022-09-06

## 2022-09-19 ENCOUNTER — TRANSCRIPTION ENCOUNTER (OUTPATIENT)
Age: 42
End: 2022-09-19

## 2022-09-23 ENCOUNTER — RX RENEWAL (OUTPATIENT)
Age: 42
End: 2022-09-23

## 2022-10-06 ENCOUNTER — APPOINTMENT (OUTPATIENT)
Dept: FAMILY MEDICINE | Facility: CLINIC | Age: 42
End: 2022-10-06

## 2022-10-06 PROCEDURE — 99213 OFFICE O/P EST LOW 20 MIN: CPT | Mod: 95

## 2022-10-06 RX ORDER — MELOXICAM 15 MG/1
15 TABLET ORAL DAILY
Qty: 14 | Refills: 0 | Status: ACTIVE | COMMUNITY
Start: 2022-10-06 | End: 1900-01-01

## 2022-12-23 ENCOUNTER — RX RENEWAL (OUTPATIENT)
Age: 42
End: 2022-12-23

## 2023-01-17 ENCOUNTER — APPOINTMENT (OUTPATIENT)
Dept: FAMILY MEDICINE | Facility: CLINIC | Age: 43
End: 2023-01-17
Payer: COMMERCIAL

## 2023-01-17 VITALS
OXYGEN SATURATION: 98 % | BODY MASS INDEX: 32.14 KG/M2 | TEMPERATURE: 97.8 F | DIASTOLIC BLOOD PRESSURE: 78 MMHG | HEART RATE: 83 BPM | SYSTOLIC BLOOD PRESSURE: 130 MMHG | WEIGHT: 217 LBS | HEIGHT: 69 IN

## 2023-01-17 PROCEDURE — 36415 COLL VENOUS BLD VENIPUNCTURE: CPT

## 2023-01-17 PROCEDURE — 99396 PREV VISIT EST AGE 40-64: CPT | Mod: 25

## 2023-01-18 LAB
25(OH)D3 SERPL-MCNC: 54.7 NG/ML
ALBUMIN SERPL ELPH-MCNC: 4.5 G/DL
ALP BLD-CCNC: 101 U/L
ALT SERPL-CCNC: 72 U/L
ANION GAP SERPL CALC-SCNC: 10 MMOL/L
APPEARANCE: CLEAR
AST SERPL-CCNC: 34 U/L
BACTERIA: NEGATIVE
BASOPHILS # BLD AUTO: 0.04 K/UL
BASOPHILS NFR BLD AUTO: 0.7 %
BILIRUB SERPL-MCNC: 0.8 MG/DL
BILIRUBIN URINE: NEGATIVE
BLOOD URINE: NEGATIVE
BUN SERPL-MCNC: 14 MG/DL
CALCIUM SERPL-MCNC: 9.6 MG/DL
CHLORIDE SERPL-SCNC: 102 MMOL/L
CHOLEST SERPL-MCNC: 178 MG/DL
CO2 SERPL-SCNC: 28 MMOL/L
COLOR: YELLOW
CREAT SERPL-MCNC: 1.13 MG/DL
EGFR: 84 ML/MIN/1.73M2
EOSINOPHIL # BLD AUTO: 0.14 K/UL
EOSINOPHIL NFR BLD AUTO: 2.4 %
ESTIMATED AVERAGE GLUCOSE: 97 MG/DL
GLUCOSE QUALITATIVE U: NEGATIVE
GLUCOSE SERPL-MCNC: 98 MG/DL
HBA1C MFR BLD HPLC: 5 %
HCT VFR BLD CALC: 45.7 %
HDLC SERPL-MCNC: 40 MG/DL
HGB BLD-MCNC: 16.8 G/DL
HYALINE CASTS: 0 /LPF
IMM GRANULOCYTES NFR BLD AUTO: 0.2 %
KETONES URINE: NEGATIVE
LDLC SERPL CALC-MCNC: 91 MG/DL
LEUKOCYTE ESTERASE URINE: NEGATIVE
LYMPHOCYTES # BLD AUTO: 2.15 K/UL
LYMPHOCYTES NFR BLD AUTO: 37.3 %
MAN DIFF?: NORMAL
MCHC RBC-ENTMCNC: 32.4 PG
MCHC RBC-ENTMCNC: 36.8 GM/DL
MCV RBC AUTO: 88.1 FL
MICROSCOPIC-UA: NORMAL
MONOCYTES # BLD AUTO: 0.46 K/UL
MONOCYTES NFR BLD AUTO: 8 %
NEUTROPHILS # BLD AUTO: 2.96 K/UL
NEUTROPHILS NFR BLD AUTO: 51.4 %
NITRITE URINE: NEGATIVE
NONHDLC SERPL-MCNC: 137 MG/DL
PH URINE: 6.5
PLATELET # BLD AUTO: 242 K/UL
POTASSIUM SERPL-SCNC: 4.1 MMOL/L
PROT SERPL-MCNC: 7.3 G/DL
PROTEIN URINE: NORMAL
PSA SERPL-MCNC: 0.76 NG/ML
RBC # BLD: 5.19 M/UL
RBC # FLD: 12.7 %
RED BLOOD CELLS URINE: 2 /HPF
SODIUM SERPL-SCNC: 140 MMOL/L
SPECIFIC GRAVITY URINE: 1.02
SQUAMOUS EPITHELIAL CELLS: 0 /HPF
TRIGL SERPL-MCNC: 232 MG/DL
TSH SERPL-ACNC: 1.61 UIU/ML
UROBILINOGEN URINE: NORMAL
WBC # FLD AUTO: 5.76 K/UL
WHITE BLOOD CELLS URINE: 1 /HPF

## 2023-01-19 LAB — INSULIN P FAST SERPL-ACNC: 13.4 UU/ML

## 2023-03-10 ENCOUNTER — RX RENEWAL (OUTPATIENT)
Age: 43
End: 2023-03-10

## 2023-04-10 ENCOUNTER — RX RENEWAL (OUTPATIENT)
Age: 43
End: 2023-04-10

## 2023-05-24 RX ORDER — METFORMIN HYDROCHLORIDE 500 MG/1
500 TABLET, FILM COATED, EXTENDED RELEASE ORAL
Qty: 180 | Refills: 0 | Status: ACTIVE | COMMUNITY
Start: 2021-02-17 | End: 1900-01-01

## 2023-06-01 ENCOUNTER — RX RENEWAL (OUTPATIENT)
Age: 43
End: 2023-06-01

## 2023-07-03 ENCOUNTER — RX RENEWAL (OUTPATIENT)
Age: 43
End: 2023-07-03

## 2023-07-19 ENCOUNTER — APPOINTMENT (OUTPATIENT)
Dept: ORTHOPEDIC SURGERY | Facility: CLINIC | Age: 43
End: 2023-07-19
Payer: COMMERCIAL

## 2023-07-19 ENCOUNTER — RESULT CHARGE (OUTPATIENT)
Age: 43
End: 2023-07-19

## 2023-07-19 VITALS — BODY MASS INDEX: 32.14 KG/M2 | HEIGHT: 69 IN | WEIGHT: 217 LBS

## 2023-07-19 DIAGNOSIS — M65.341 TRIGGER FINGER, RIGHT RING FINGER: ICD-10-CM

## 2023-07-19 PROCEDURE — 99213 OFFICE O/P EST LOW 20 MIN: CPT

## 2023-07-19 PROCEDURE — 73140 X-RAY EXAM OF FINGER(S): CPT | Mod: RT

## 2023-07-20 PROBLEM — M65.341 TRIGGER RING FINGER OF RIGHT HAND: Status: ACTIVE | Noted: 2022-06-21

## 2023-07-20 NOTE — HISTORY OF PRESENT ILLNESS
[7] : 7 [6] : 6 [de-identified] : R ring finger pain. Had CSI last year. [FreeTextEntry1] : rt ring finger  [FreeTextEntry5] : pt c.o pain in rt ring finger since today state he was cracking his finger and felt pain after

## 2023-07-20 NOTE — ASSESSMENT
[FreeTextEntry1] : condition discussed\par he will consider repeat injection vs surgery\par fu brown

## 2023-07-20 NOTE — PHYSICAL EXAM
[de-identified] : R hand: \par Mild swelling \par Tender 4th A1 pulley \par Decreased ring ROM \par +ring triggering

## 2023-07-24 ENCOUNTER — RX RENEWAL (OUTPATIENT)
Age: 43
End: 2023-07-24

## 2023-07-26 ENCOUNTER — APPOINTMENT (OUTPATIENT)
Dept: ORTHOPEDIC SURGERY | Facility: CLINIC | Age: 43
End: 2023-07-26

## 2023-08-16 ENCOUNTER — RX RENEWAL (OUTPATIENT)
Age: 43
End: 2023-08-16

## 2023-10-23 ENCOUNTER — RX RENEWAL (OUTPATIENT)
Age: 43
End: 2023-10-23

## 2023-11-07 ENCOUNTER — NON-APPOINTMENT (OUTPATIENT)
Age: 43
End: 2023-11-07

## 2023-11-14 ENCOUNTER — RX RENEWAL (OUTPATIENT)
Age: 43
End: 2023-11-14

## 2023-12-05 ENCOUNTER — NON-APPOINTMENT (OUTPATIENT)
Age: 43
End: 2023-12-05

## 2024-01-16 ENCOUNTER — RX RENEWAL (OUTPATIENT)
Age: 44
End: 2024-01-16

## 2024-01-30 ENCOUNTER — RX RENEWAL (OUTPATIENT)
Age: 44
End: 2024-01-30

## 2024-04-10 ENCOUNTER — RX RENEWAL (OUTPATIENT)
Age: 44
End: 2024-04-10

## 2024-04-18 ENCOUNTER — APPOINTMENT (OUTPATIENT)
Dept: FAMILY MEDICINE | Facility: CLINIC | Age: 44
End: 2024-04-18
Payer: COMMERCIAL

## 2024-04-18 VITALS
HEIGHT: 69 IN | SYSTOLIC BLOOD PRESSURE: 130 MMHG | TEMPERATURE: 97.6 F | WEIGHT: 229 LBS | HEART RATE: 80 BPM | OXYGEN SATURATION: 97 % | DIASTOLIC BLOOD PRESSURE: 82 MMHG | RESPIRATION RATE: 14 BRPM | BODY MASS INDEX: 33.92 KG/M2

## 2024-04-18 VITALS
OXYGEN SATURATION: 99 % | HEIGHT: 69 IN | WEIGHT: 183 LBS | RESPIRATION RATE: 18 BRPM | BODY MASS INDEX: 27.11 KG/M2 | DIASTOLIC BLOOD PRESSURE: 78 MMHG | HEART RATE: 76 BPM | TEMPERATURE: 99 F | SYSTOLIC BLOOD PRESSURE: 122 MMHG

## 2024-04-18 DIAGNOSIS — K76.0 FATTY (CHANGE OF) LIVER, NOT ELSEWHERE CLASSIFIED: ICD-10-CM

## 2024-04-18 PROCEDURE — 99214 OFFICE O/P EST MOD 30 MIN: CPT

## 2024-04-18 RX ORDER — HYDROCORTISONE 25 MG/G
2.5 CREAM TOPICAL
Qty: 1 | Refills: 2 | Status: ACTIVE | COMMUNITY
Start: 2021-07-23 | End: 1900-01-01

## 2024-04-24 ENCOUNTER — RX RENEWAL (OUTPATIENT)
Age: 44
End: 2024-04-24

## 2024-04-24 RX ORDER — METFORMIN ER 500 MG 500 MG/1
500 TABLET ORAL
Qty: 180 | Refills: 0 | Status: ACTIVE | COMMUNITY
Start: 2022-02-19 | End: 1900-01-01

## 2024-05-13 ENCOUNTER — APPOINTMENT (OUTPATIENT)
Dept: FAMILY MEDICINE | Facility: CLINIC | Age: 44
End: 2024-05-13
Payer: COMMERCIAL

## 2024-05-13 VITALS
WEIGHT: 225 LBS | HEIGHT: 69 IN | SYSTOLIC BLOOD PRESSURE: 172 MMHG | DIASTOLIC BLOOD PRESSURE: 80 MMHG | RESPIRATION RATE: 16 BRPM | TEMPERATURE: 98 F | HEART RATE: 76 BPM | OXYGEN SATURATION: 97 % | BODY MASS INDEX: 33.33 KG/M2

## 2024-05-13 DIAGNOSIS — Z00.00 ENCOUNTER FOR GENERAL ADULT MEDICAL EXAMINATION W/OUT ABNORMAL FINDINGS: ICD-10-CM

## 2024-05-13 PROCEDURE — 99396 PREV VISIT EST AGE 40-64: CPT

## 2024-05-13 PROCEDURE — 36415 COLL VENOUS BLD VENIPUNCTURE: CPT

## 2024-05-14 LAB
25(OH)D3 SERPL-MCNC: 57.3 NG/ML
ALBUMIN SERPL ELPH-MCNC: 4.7 G/DL
ALP BLD-CCNC: 103 U/L
ALT SERPL-CCNC: 77 U/L
ANION GAP SERPL CALC-SCNC: 16 MMOL/L
APPEARANCE: CLEAR
AST SERPL-CCNC: 44 U/L
BACTERIA: NEGATIVE /HPF
BILIRUB SERPL-MCNC: 0.9 MG/DL
BILIRUBIN URINE: NEGATIVE
BLOOD URINE: NEGATIVE
BUN SERPL-MCNC: 12 MG/DL
CALCIUM SERPL-MCNC: 9.4 MG/DL
CAST: 0 /LPF
CHLORIDE SERPL-SCNC: 100 MMOL/L
CHOLEST SERPL-MCNC: 188 MG/DL
CO2 SERPL-SCNC: 24 MMOL/L
COLOR: YELLOW
CREAT SERPL-MCNC: 1.04 MG/DL
EGFR: 92 ML/MIN/1.73M2
EPITHELIAL CELLS: 0 /HPF
ESTIMATED AVERAGE GLUCOSE: 94 MG/DL
GLUCOSE QUALITATIVE U: NEGATIVE MG/DL
GLUCOSE SERPL-MCNC: 90 MG/DL
HBA1C MFR BLD HPLC: 4.9 %
HDLC SERPL-MCNC: 41 MG/DL
KETONES URINE: NEGATIVE MG/DL
LDLC SERPL CALC-MCNC: 110 MG/DL
LEUKOCYTE ESTERASE URINE: NEGATIVE
MICROSCOPIC-UA: NORMAL
NITRITE URINE: NEGATIVE
NONHDLC SERPL-MCNC: 147 MG/DL
PH URINE: 6.5
POTASSIUM SERPL-SCNC: 3.9 MMOL/L
PROT SERPL-MCNC: 7.6 G/DL
PROTEIN URINE: NEGATIVE MG/DL
PSA SERPL-MCNC: 0.76 NG/ML
RED BLOOD CELLS URINE: 0 /HPF
SODIUM SERPL-SCNC: 140 MMOL/L
SPECIFIC GRAVITY URINE: 1.01
TRIGL SERPL-MCNC: 213 MG/DL
TSH SERPL-ACNC: 1.13 UIU/ML
UROBILINOGEN URINE: 0.2 MG/DL
WHITE BLOOD CELLS URINE: 0 /HPF

## 2024-05-15 LAB
BASOPHILS # BLD AUTO: 0.04 K/UL
BASOPHILS NFR BLD AUTO: 0.6 %
EOSINOPHIL # BLD AUTO: 0.14 K/UL
EOSINOPHIL NFR BLD AUTO: 2.1 %
HCT VFR BLD CALC: 48.3 %
HGB BLD-MCNC: 17 G/DL
IMM GRANULOCYTES NFR BLD AUTO: 0.3 %
LYMPHOCYTES # BLD AUTO: 2.24 K/UL
LYMPHOCYTES NFR BLD AUTO: 33.1 %
MAN DIFF?: NORMAL
MCHC RBC-ENTMCNC: 32.1 PG
MCHC RBC-ENTMCNC: 35.2 GM/DL
MCV RBC AUTO: 91.1 FL
MONOCYTES # BLD AUTO: 0.43 K/UL
MONOCYTES NFR BLD AUTO: 6.4 %
NEUTROPHILS # BLD AUTO: 3.89 K/UL
NEUTROPHILS NFR BLD AUTO: 57.5 %
PLATELET # BLD AUTO: 286 K/UL
RBC # BLD: 5.3 M/UL
RBC # FLD: 13.2 %
WBC # FLD AUTO: 6.76 K/UL

## 2024-06-10 ENCOUNTER — TRANSCRIPTION ENCOUNTER (OUTPATIENT)
Age: 44
End: 2024-06-10

## 2024-06-17 ENCOUNTER — APPOINTMENT (OUTPATIENT)
Dept: FAMILY MEDICINE | Facility: CLINIC | Age: 44
End: 2024-06-17
Payer: COMMERCIAL

## 2024-06-17 VITALS
BODY MASS INDEX: 33.62 KG/M2 | SYSTOLIC BLOOD PRESSURE: 138 MMHG | TEMPERATURE: 97.9 F | WEIGHT: 227 LBS | RESPIRATION RATE: 16 BRPM | DIASTOLIC BLOOD PRESSURE: 100 MMHG | OXYGEN SATURATION: 98 % | HEART RATE: 67 BPM | HEIGHT: 69 IN

## 2024-06-17 VITALS — DIASTOLIC BLOOD PRESSURE: 104 MMHG | SYSTOLIC BLOOD PRESSURE: 164 MMHG

## 2024-06-17 DIAGNOSIS — E78.1 PURE HYPERGLYCERIDEMIA: ICD-10-CM

## 2024-06-17 DIAGNOSIS — E16.1 OTHER HYPOGLYCEMIA: ICD-10-CM

## 2024-06-17 DIAGNOSIS — I10 ESSENTIAL (PRIMARY) HYPERTENSION: ICD-10-CM

## 2024-06-17 PROCEDURE — 99213 OFFICE O/P EST LOW 20 MIN: CPT

## 2024-06-17 RX ORDER — RAMIPRIL 10 MG/1
10 CAPSULE ORAL
Qty: 90 | Refills: 0 | Status: ACTIVE | COMMUNITY
Start: 2024-05-13 | End: 1900-01-01

## 2024-06-18 NOTE — ASSESSMENT
[FreeTextEntry1] : #HTN  Increase Ramipril to 10 mg daily.  counseled on heart healthy diet and exercise.  Discussed importance of low NA diet.   monitor BP at home  BP goal <140/90  F/u in 4 weeks.

## 2024-06-18 NOTE — HISTORY OF PRESENT ILLNESS
[FreeTextEntry1] : follow up with BP  [de-identified] : Mr. NINFA CAMPBELL is a 42 yearold male presents today for BP check.  Reports BP found to be high 2 weeks ago and was started on Ramipril 5 mg daily.  Bp found to be high today. Pt asymptomatic.

## 2024-06-27 ENCOUNTER — RX RENEWAL (OUTPATIENT)
Age: 44
End: 2024-06-27

## 2024-07-08 ENCOUNTER — RX RENEWAL (OUTPATIENT)
Age: 44
End: 2024-07-08

## 2024-07-08 ENCOUNTER — TRANSCRIPTION ENCOUNTER (OUTPATIENT)
Age: 44
End: 2024-07-08

## 2024-07-08 RX ORDER — AMLODIPINE BESYLATE 5 MG/1
5 TABLET ORAL DAILY
Qty: 90 | Refills: 0 | Status: ACTIVE | COMMUNITY
Start: 2024-07-08 | End: 1900-01-01

## 2024-07-08 RX ORDER — RAMIPRIL 5 MG/1
5 CAPSULE ORAL DAILY
Qty: 90 | Refills: 1 | Status: DISCONTINUED | COMMUNITY
Start: 2024-07-08 | End: 2024-07-08

## 2024-08-05 ENCOUNTER — APPOINTMENT (OUTPATIENT)
Dept: FAMILY MEDICINE | Facility: CLINIC | Age: 44
End: 2024-08-05

## 2024-08-05 PROCEDURE — 99213 OFFICE O/P EST LOW 20 MIN: CPT

## 2024-08-21 ENCOUNTER — APPOINTMENT (OUTPATIENT)
Dept: DERMATOLOGY | Facility: CLINIC | Age: 44
End: 2024-08-21
Payer: COMMERCIAL

## 2024-08-21 DIAGNOSIS — R21 RASH AND OTHER NONSPECIFIC SKIN ERUPTION: ICD-10-CM

## 2024-08-21 DIAGNOSIS — D22.9 MELANOCYTIC NEVI, UNSPECIFIED: ICD-10-CM

## 2024-08-21 DIAGNOSIS — D48.5 NEOPLASM OF UNCERTAIN BEHAVIOR OF SKIN: ICD-10-CM

## 2024-08-21 DIAGNOSIS — L21.9 SEBORRHEIC DERMATITIS, UNSPECIFIED: ICD-10-CM

## 2024-08-21 DIAGNOSIS — L81.4 OTHER MELANIN HYPERPIGMENTATION: ICD-10-CM

## 2024-08-21 DIAGNOSIS — Z12.83 ENCOUNTER FOR SCREENING FOR MALIGNANT NEOPLASM OF SKIN: ICD-10-CM

## 2024-08-21 PROCEDURE — 11102 TANGNTL BX SKIN SINGLE LES: CPT

## 2024-08-21 PROCEDURE — 11103 TANGNTL BX SKIN EA SEP/ADDL: CPT | Mod: 59

## 2024-08-21 PROCEDURE — 99204 OFFICE O/P NEW MOD 45 MIN: CPT | Mod: 25

## 2024-08-21 RX ORDER — TRIAMCINOLONE ACETONIDE 1 MG/G
0.1 OINTMENT TOPICAL TWICE DAILY
Qty: 1 | Refills: 9 | Status: ACTIVE | COMMUNITY
Start: 2024-08-21 | End: 1900-01-01

## 2024-08-21 NOTE — HISTORY OF PRESENT ILLNESS
[FreeTextEntry1] : skin check [de-identified] : 43 year old male. skin check. growth on right forearm and neck. itchy on right upper arm.

## 2024-08-21 NOTE — ASSESSMENT
[FreeTextEntry1] : 1) benign findings as above- education  2) rash TAC BID PRN; SED  3) seb derm ketoconazole cream PRN; SED  4) Shave bx location right forearm, neck diagnosis: r/o molluscum, r/o IFP   Shave biopsy performed today over above location, risks and benefits discussed including incomplete removal, not enough tissue for diagnosis scarring and infection, informed consent obtained, pictures taken,  cleaned with alcohol and anesthetized with 1%lido+epi, 0.3 cc total, hemostasis obtained with cautery, vaseline and bandaid placed, tolerated well, wound care reviewed, specimen sent to pathology.

## 2024-08-21 NOTE — PHYSICAL EXAM
[FreeTextEntry3] : AAOx3, pleasant, NAD, no visual lymphadenopathy hair, scalp, face, nose, eyelids, ears, lips, oropharynx, neck, chest, abdomen, back, right arm, left arm, nails, and hands examined with all normal findings, pertinent findings include:  multiple benign nevi and lentigines red plaque on right upper arm red yellow papule on right forearm verrucous papule on neck

## 2024-09-24 ENCOUNTER — RX RENEWAL (OUTPATIENT)
Age: 44
End: 2024-09-24

## 2024-10-08 ENCOUNTER — NON-APPOINTMENT (OUTPATIENT)
Age: 44
End: 2024-10-08

## 2024-10-10 ENCOUNTER — EMERGENCY (EMERGENCY)
Facility: HOSPITAL | Age: 44
LOS: 1 days | Discharge: HOME | End: 2024-10-10
Attending: STUDENT IN AN ORGANIZED HEALTH CARE EDUCATION/TRAINING PROGRAM | Admitting: STUDENT IN AN ORGANIZED HEALTH CARE EDUCATION/TRAINING PROGRAM
Payer: COMMERCIAL

## 2024-10-10 VITALS
TEMPERATURE: 98 F | WEIGHT: 220.02 LBS | RESPIRATION RATE: 16 BRPM | SYSTOLIC BLOOD PRESSURE: 188 MMHG | HEART RATE: 80 BPM | OXYGEN SATURATION: 98 % | DIASTOLIC BLOOD PRESSURE: 143 MMHG | HEIGHT: 69 IN

## 2024-10-10 LAB
ALBUMIN SERPL ELPH-MCNC: 4.4 G/DL — SIGNIFICANT CHANGE UP (ref 3.3–5)
ALP SERPL-CCNC: 98 U/L — SIGNIFICANT CHANGE UP (ref 40–120)
ALT FLD-CCNC: 70 U/L — SIGNIFICANT CHANGE UP (ref 12–78)
ANION GAP SERPL CALC-SCNC: 11 MMOL/L — SIGNIFICANT CHANGE UP (ref 5–17)
APPEARANCE UR: ABNORMAL
AST SERPL-CCNC: 37 U/L — SIGNIFICANT CHANGE UP (ref 15–37)
BASOPHILS # BLD AUTO: 0.03 K/UL — SIGNIFICANT CHANGE UP (ref 0–0.2)
BASOPHILS NFR BLD AUTO: 0.3 % — SIGNIFICANT CHANGE UP (ref 0–2)
BILIRUB SERPL-MCNC: 1.1 MG/DL — SIGNIFICANT CHANGE UP (ref 0.2–1.2)
BILIRUB UR-MCNC: NEGATIVE — SIGNIFICANT CHANGE UP
BUN SERPL-MCNC: 16 MG/DL — SIGNIFICANT CHANGE UP (ref 7–23)
CALCIUM SERPL-MCNC: 9.4 MG/DL — SIGNIFICANT CHANGE UP (ref 8.5–10.1)
CHLORIDE SERPL-SCNC: 103 MMOL/L — SIGNIFICANT CHANGE UP (ref 96–108)
CO2 SERPL-SCNC: 26 MMOL/L — SIGNIFICANT CHANGE UP (ref 22–31)
COLOR SPEC: YELLOW — SIGNIFICANT CHANGE UP
CREAT SERPL-MCNC: 1.4 MG/DL — HIGH (ref 0.5–1.3)
DIFF PNL FLD: NEGATIVE — SIGNIFICANT CHANGE UP
EGFR: 64 ML/MIN/1.73M2 — SIGNIFICANT CHANGE UP
EOSINOPHIL # BLD AUTO: 0.03 K/UL — SIGNIFICANT CHANGE UP (ref 0–0.5)
EOSINOPHIL NFR BLD AUTO: 0.3 % — SIGNIFICANT CHANGE UP (ref 0–6)
GLUCOSE SERPL-MCNC: 147 MG/DL — HIGH (ref 70–99)
GLUCOSE UR QL: NEGATIVE MG/DL — SIGNIFICANT CHANGE UP
HCT VFR BLD CALC: 44.2 % — SIGNIFICANT CHANGE UP (ref 39–50)
HGB BLD-MCNC: 16.3 G/DL — SIGNIFICANT CHANGE UP (ref 13–17)
IMM GRANULOCYTES NFR BLD AUTO: 0.4 % — SIGNIFICANT CHANGE UP (ref 0–0.9)
KETONES UR-MCNC: NEGATIVE MG/DL — SIGNIFICANT CHANGE UP
LEUKOCYTE ESTERASE UR-ACNC: NEGATIVE — SIGNIFICANT CHANGE UP
LIDOCAIN IGE QN: 53 U/L — SIGNIFICANT CHANGE UP (ref 13–75)
LYMPHOCYTES # BLD AUTO: 1.17 K/UL — SIGNIFICANT CHANGE UP (ref 1–3.3)
LYMPHOCYTES # BLD AUTO: 12.1 % — LOW (ref 13–44)
MCHC RBC-ENTMCNC: 32.4 PG — SIGNIFICANT CHANGE UP (ref 27–34)
MCHC RBC-ENTMCNC: 36.9 GM/DL — HIGH (ref 32–36)
MCV RBC AUTO: 87.9 FL — SIGNIFICANT CHANGE UP (ref 80–100)
MONOCYTES # BLD AUTO: 0.63 K/UL — SIGNIFICANT CHANGE UP (ref 0–0.9)
MONOCYTES NFR BLD AUTO: 6.5 % — SIGNIFICANT CHANGE UP (ref 2–14)
NEUTROPHILS # BLD AUTO: 7.74 K/UL — HIGH (ref 1.8–7.4)
NEUTROPHILS NFR BLD AUTO: 80.4 % — HIGH (ref 43–77)
NITRITE UR-MCNC: NEGATIVE — SIGNIFICANT CHANGE UP
NRBC # BLD: 0 /100 WBCS — SIGNIFICANT CHANGE UP (ref 0–0)
PH UR: 8 — SIGNIFICANT CHANGE UP (ref 5–8)
PLATELET # BLD AUTO: 266 K/UL — SIGNIFICANT CHANGE UP (ref 150–400)
POTASSIUM SERPL-MCNC: 3.4 MMOL/L — LOW (ref 3.5–5.3)
POTASSIUM SERPL-SCNC: 3.4 MMOL/L — LOW (ref 3.5–5.3)
PROT SERPL-MCNC: 8.1 G/DL — SIGNIFICANT CHANGE UP (ref 6–8.3)
PROT UR-MCNC: NEGATIVE MG/DL — SIGNIFICANT CHANGE UP
RBC # BLD: 5.03 M/UL — SIGNIFICANT CHANGE UP (ref 4.2–5.8)
RBC # FLD: 12.9 % — SIGNIFICANT CHANGE UP (ref 10.3–14.5)
SODIUM SERPL-SCNC: 140 MMOL/L — SIGNIFICANT CHANGE UP (ref 135–145)
SP GR SPEC: 1.01 — SIGNIFICANT CHANGE UP (ref 1–1.03)
UROBILINOGEN FLD QL: 0.2 MG/DL — SIGNIFICANT CHANGE UP (ref 0.2–1)
WBC # BLD: 9.64 K/UL — SIGNIFICANT CHANGE UP (ref 3.8–10.5)
WBC # FLD AUTO: 9.64 K/UL — SIGNIFICANT CHANGE UP (ref 3.8–10.5)

## 2024-10-10 PROCEDURE — 99284 EMERGENCY DEPT VISIT MOD MDM: CPT | Mod: 25

## 2024-10-10 PROCEDURE — 96375 TX/PRO/DX INJ NEW DRUG ADDON: CPT

## 2024-10-10 PROCEDURE — 80053 COMPREHEN METABOLIC PANEL: CPT

## 2024-10-10 PROCEDURE — 81001 URINALYSIS AUTO W/SCOPE: CPT

## 2024-10-10 PROCEDURE — 74176 CT ABD & PELVIS W/O CONTRAST: CPT | Mod: MC

## 2024-10-10 PROCEDURE — 96366 THER/PROPH/DIAG IV INF ADDON: CPT

## 2024-10-10 PROCEDURE — 96365 THER/PROPH/DIAG IV INF INIT: CPT

## 2024-10-10 PROCEDURE — 83690 ASSAY OF LIPASE: CPT

## 2024-10-10 PROCEDURE — 74176 CT ABD & PELVIS W/O CONTRAST: CPT | Mod: 26,MC

## 2024-10-10 PROCEDURE — 85025 COMPLETE CBC W/AUTO DIFF WBC: CPT

## 2024-10-10 PROCEDURE — 99284 EMERGENCY DEPT VISIT MOD MDM: CPT

## 2024-10-10 PROCEDURE — 36415 COLL VENOUS BLD VENIPUNCTURE: CPT

## 2024-10-10 RX ORDER — OXYCODONE HYDROCHLORIDE 30 MG/1
5 TABLET, FILM COATED, EXTENDED RELEASE ORAL ONCE
Refills: 0 | Status: DISCONTINUED | OUTPATIENT
Start: 2024-10-10 | End: 2024-10-10

## 2024-10-10 RX ORDER — ACETAMINOPHEN 325 MG
1000 TABLET ORAL ONCE
Refills: 0 | Status: COMPLETED | OUTPATIENT
Start: 2024-10-10 | End: 2024-10-10

## 2024-10-10 RX ORDER — SODIUM CHLORIDE 0.9 % (FLUSH) 0.9 %
1000 SYRINGE (ML) INJECTION ONCE
Refills: 0 | Status: COMPLETED | OUTPATIENT
Start: 2024-10-10 | End: 2024-10-10

## 2024-10-10 RX ORDER — KETOROLAC TROMETHAMINE 10 MG/1
15 TABLET, FILM COATED ORAL ONCE
Refills: 0 | Status: DISCONTINUED | OUTPATIENT
Start: 2024-10-10 | End: 2024-10-10

## 2024-10-10 RX ORDER — OXYCODONE AND ACETAMINOPHEN 5; 325 MG/1; MG/1
1 TABLET ORAL
Qty: 9 | Refills: 0
Start: 2024-10-10 | End: 2024-10-12

## 2024-10-10 RX ORDER — TAMSULOSIN HCL 0.4 MG
1 CAPSULE ORAL
Qty: 7 | Refills: 0
Start: 2024-10-10 | End: 2024-10-16

## 2024-10-10 RX ADMIN — Medication 1000 MILLILITER(S): at 17:01

## 2024-10-10 RX ADMIN — OXYCODONE HYDROCHLORIDE 5 MILLIGRAM(S): 30 TABLET, FILM COATED, EXTENDED RELEASE ORAL at 19:39

## 2024-10-10 RX ADMIN — KETOROLAC TROMETHAMINE 15 MILLIGRAM(S): 10 TABLET, FILM COATED ORAL at 19:30

## 2024-10-10 RX ADMIN — Medication 400 MILLIGRAM(S): at 17:01

## 2024-10-10 RX ADMIN — Medication 1000 MILLILITER(S): at 19:30

## 2024-10-10 RX ADMIN — KETOROLAC TROMETHAMINE 15 MILLIGRAM(S): 10 TABLET, FILM COATED ORAL at 18:33

## 2024-10-10 RX ADMIN — Medication 1000 MILLIGRAM(S): at 19:29

## 2024-10-10 NOTE — ED PROVIDER NOTE - CLINICAL SUMMARY MEDICAL DECISION MAKING FREE TEXT BOX
I, Rafael Solorio MD, have seen and examined the patient on the date of service.  I agree with the JELENA's assessment as written, with exceptions or additions as noted below or in a separate note.  Patient with past medical history of hypertension is presenting with concern for left-sided flank pain.  States she was at urgent care earlier this week and found to have microscopic hematuria, started on antibiotics.  Today he developed left-sided flank pain that radiates towards his left groin.  Worsening over the day.  Endorsing some polyuria with this.  No fevers, vomiting, chest pain or shortness of breath.  On exam he has tenderness palpation of the left side of his abdomen.  Concern for renal stone versus pyelonephritis versus less likely diverticulitis.  Will treat symptomatically, check lab work, CT, urinalysis and reassess.

## 2024-10-10 NOTE — ED PROVIDER NOTE - CARE PROVIDER_API CALL
Latisha Chong [facility]  Urology  [address] [place] [id number]  Phone: [phone]  Fax: [phone]  Follow Up Time: 1-3 Days

## 2024-10-10 NOTE — ED PROVIDER NOTE - OBJECTIVE STATEMENT
44 y/o male with PMHx kidney stones presents today due to flank pain x today. pt reports he went to an urgent care earlier in the week in which he was prescribed Keflex. pt reports he feels constant urge to urinate. pt began to feel pain today described as aching, non-radiating, and currently 8/10. pt denies fever, vomiting, dysuria, hematuria, cp, sob, fall/injury, or any other complaints.

## 2024-10-10 NOTE — ED PROVIDER NOTE - CONDITION AT DISCHARGE:
Pt arrived via stretcher from [facility] for CT Guided Lung Biopsy. AAOx4,- procedure explained and consent obtained by Dr. Malloy. Time out performed. Pt received Fentanyl 25 mcg  and Versed 1 mg IV. Vital signs monitored and remained stable for duration of procedure. Biopsies collected By Dr. Malloy. Specimens submitted to lab for Pathology. Bandage applied to pts right upper back. CDI- Report called to post op nurse  Pt transported to [facility]  Stat Post- Chest CT - negative for pneumo  Stat Post- Chest  Xray - negative for pneumo  3 hour STAT chest Xray ordered and due at 1330 and must be cleared by radiologist prior to pts discharge   Improved

## 2024-10-10 NOTE — ED PROVIDER NOTE - NSFOLLOWUPINSTRUCTIONS_ED_ALL_ED_FT
Follow up with your primary care doctor and urology. Return for fever, vomiting, bloody urine, worsening condition.       Kidney Stones    WHAT YOU NEED TO KNOW:    What is a kidney stone? Kidney stones form in the urinary system when the water and waste in your urine are out of balance. When this happens, certain types of waste crystals separate from the urine. The crystals build up and form kidney stones. Kidney stones can be made of uric acid, calcium, phosphate, or oxalate crystals. You may have more than one kidney stone.  Kidney Stones     What increases my risk for kidney stones?    Not drinking enough liquids (especially water) each day    Having urinary tract infections often    Too much of certain foods, such as meat, salt, nuts, and chocolate    Obesity    Certain medicines, such as diuretics, steroids, and antacids    A family history of kidney stones    Being born with a kidney or bowel disorder  What are the signs and symptoms of kidney stones?    Pain in the middle of your back that moves across to your side or that may spread to your groin    Nausea and vomiting    Urge to urinate often, burning feeling when you urinate, or pink or red urine    Tenderness in your lower back, side, or stomach  How are kidney stones diagnosed? Your healthcare provider will ask about your health and usual foods. He or she may refer you to a urologist. You may need tests to find out what type of kidney stones you have. Tests can show the size of your kidney stones and where they are in your urinary system. You may need more than one of the following:    Urine tests may show if you have blood in your urine. They may also show high amounts of the substances that form kidney stones, such as uric acid.    Blood tests show how well your kidneys are working. They may also be used to check the levels of calcium or uric acid in your blood.    X-ray or ultrasound pictures may be taken of your kidneys, bladder, and ureters. You may be given contrast liquid before an x-ray to help these show up better in the pictures. You may need to have more than one x-ray. Tell the healthcare provider if you have ever had an allergic reaction to contrast liquid.  How are kidney stones treated?    NSAIDs, such as ibuprofen, help decrease swelling, pain, and fever. This medicine is available with or without a doctor's order. NSAIDs can cause stomach bleeding or kidney problems in certain people. If you take blood thinner medicine, always ask your healthcare provider if NSAIDs are safe for you. Always read the medicine label and follow directions.    Acetaminophen decreases pain and fever. It is available without a doctor's order. Ask how much to take and how often to take it. Follow directions. Read the labels of all other medicines you are using to see if they also contain acetaminophen, or ask your doctor or pharmacist. Acetaminophen can cause liver damage if not taken correctly.    Prescription pain medicine may be given. Ask your healthcare provider how to take this medicine safely. Some prescription pain medicines contain acetaminophen. Do not take other medicines that contain acetaminophen without talking to your healthcare provider. Too much acetaminophen may cause liver damage. Prescription pain medicine may cause constipation. Ask your healthcare provider how to prevent or treat constipation.    Medicines to balance your electrolytes may be needed.    A procedure or surgery to remove the kidney stones may be needed if they do not pass on their own. Your treatment will depend on the size and location of your kidney stones.  What can I do to manage kidney stones?    Drink more liquids. Your healthcare provider may tell you to drink at least 8 to 12 (eight-ounce) cups of liquids each day. This helps flush out the kidney stones when you urinate. Water is the best liquid to drink.    Strain your urine every time you go to the bathroom. Urinate through a strainer or a piece of thin cloth to catch the stones. Take the stones to your healthcare provider so they can be sent to the lab for tests. This will help your healthcare providers plan the best treatment for you.  Look for Stones in the Filter      Ask if you should avoid any foods. You may need to limit oxalate. Oxalate is a chemical found in some plant foods. The most common type of kidney stone is made up of crystals that contain calcium and oxalate. Your healthcare provider or dietitian may recommend that you limit oxalate if you get this type of kidney stone often. You may need to limit how much sodium (salt) or protein you eat. Ask for information about the best foods for you.  High Oxalate Foods      Be physically active as directed. Your stones may pass more easily if you stay active. Physical activity can also help you manage your weight. Ask about the best activities for you.   Family Walking for Exercise  When should I seek immediate care?    You are vomiting and it is not relieved with medicine.    When should I call my doctor?    You have a fever.    You have trouble urinating.    You see blood in your urine.    You have severe pain.    You have any questions or concerns about your condition or care.  CARE AGREEMENT:    You have the right to help plan your care. Learn about your health condition and how it may be treated. Discuss treatment options with your healthcare providers to decide what care you want to receive. You always have the right to refuse treatment.

## 2024-10-10 NOTE — ED PROVIDER NOTE - PATIENT PORTAL LINK FT
You can access the FollowMyHealth Patient Portal offered by [facility] by registering at the following website: [email or web address]. By joining [employer]’s FollowMyHealth portal, you will also be able to view your health information using other applications (apps) compatible with our system.

## 2024-10-10 NOTE — ED ADULT NURSE NOTE - NSFALLUNIVINTERV_ED_ALL_ED
Bed/Stretcher in lowest position, wheels locked, appropriate side rails in place/Call bell, personal items and telephone in reach/Instruct patient to call for assistance before getting out of bed/chair/stretcher/Non-slip footwear applied when patient is off stretcher/[place] to call system/Physically safe environment - no spills, clutter or unnecessary equipment/Purposeful proactive rounding/Room/bathroom lighting operational, light cord in reach

## 2024-10-16 NOTE — ED ADULT NURSE NOTE - FINAL NURSING ELECTRONIC SIGNATURE
10/16/2024  Tristen Jonas 
10-Oct-2024 20:09

## 2024-10-26 ENCOUNTER — RX RENEWAL (OUTPATIENT)
Age: 44
End: 2024-10-26

## 2024-10-28 ENCOUNTER — RX RENEWAL (OUTPATIENT)
Age: 44
End: 2024-10-28

## 2024-12-09 ENCOUNTER — APPOINTMENT (OUTPATIENT)
Dept: FAMILY MEDICINE | Facility: CLINIC | Age: 44
End: 2024-12-09
Payer: COMMERCIAL

## 2024-12-09 VITALS
RESPIRATION RATE: 16 BRPM | BODY MASS INDEX: 32.88 KG/M2 | DIASTOLIC BLOOD PRESSURE: 94 MMHG | HEART RATE: 82 BPM | HEIGHT: 69 IN | WEIGHT: 222 LBS | SYSTOLIC BLOOD PRESSURE: 144 MMHG | TEMPERATURE: 97.1 F | OXYGEN SATURATION: 97 %

## 2024-12-09 DIAGNOSIS — E78.00 PURE HYPERCHOLESTEROLEMIA, UNSPECIFIED: ICD-10-CM

## 2024-12-09 DIAGNOSIS — I10 ESSENTIAL (PRIMARY) HYPERTENSION: ICD-10-CM

## 2024-12-09 DIAGNOSIS — E16.1 OTHER HYPOGLYCEMIA: ICD-10-CM

## 2024-12-09 DIAGNOSIS — E78.1 PURE HYPERGLYCERIDEMIA: ICD-10-CM

## 2024-12-09 PROCEDURE — 36415 COLL VENOUS BLD VENIPUNCTURE: CPT

## 2024-12-09 PROCEDURE — 99214 OFFICE O/P EST MOD 30 MIN: CPT

## 2024-12-10 LAB
ALBUMIN SERPL ELPH-MCNC: 4.7 G/DL
ALP BLD-CCNC: 102 U/L
ALT SERPL-CCNC: 45 U/L
ANION GAP SERPL CALC-SCNC: 16 MMOL/L
AST SERPL-CCNC: 32 U/L
BASOPHILS # BLD AUTO: 0.03 K/UL
BASOPHILS NFR BLD AUTO: 0.4 %
BILIRUB SERPL-MCNC: 0.8 MG/DL
BUN SERPL-MCNC: 11 MG/DL
CALCIUM SERPL-MCNC: 9.8 MG/DL
CHLORIDE SERPL-SCNC: 101 MMOL/L
CHOLEST SERPL-MCNC: 167 MG/DL
CO2 SERPL-SCNC: 24 MMOL/L
CREAT SERPL-MCNC: 1 MG/DL
EGFR: 96 ML/MIN/1.73M2
EOSINOPHIL # BLD AUTO: 0.05 K/UL
EOSINOPHIL NFR BLD AUTO: 0.7 %
GLUCOSE SERPL-MCNC: 98 MG/DL
HCT VFR BLD CALC: 48.9 %
HDLC SERPL-MCNC: 43 MG/DL
HGB BLD-MCNC: 17 G/DL
IMM GRANULOCYTES NFR BLD AUTO: 0.4 %
INSULIN SERPL-MCNC: 13.5 UU/ML
LDLC SERPL CALC-MCNC: 86 MG/DL
LYMPHOCYTES # BLD AUTO: 2.14 K/UL
LYMPHOCYTES NFR BLD AUTO: 29.9 %
MAN DIFF?: NORMAL
MCHC RBC-ENTMCNC: 31.5 PG
MCHC RBC-ENTMCNC: 34.8 G/DL
MCV RBC AUTO: 90.7 FL
MONOCYTES # BLD AUTO: 0.52 K/UL
MONOCYTES NFR BLD AUTO: 7.3 %
NEUTROPHILS # BLD AUTO: 4.38 K/UL
NEUTROPHILS NFR BLD AUTO: 61.3 %
NONHDLC SERPL-MCNC: 123 MG/DL
PLATELET # BLD AUTO: 270 K/UL
POTASSIUM SERPL-SCNC: 3.9 MMOL/L
PROT SERPL-MCNC: 7.7 G/DL
RBC # BLD: 5.39 M/UL
RBC # FLD: 12.5 %
SODIUM SERPL-SCNC: 141 MMOL/L
TRIGL SERPL-MCNC: 224 MG/DL
TSH SERPL-ACNC: 1.44 UIU/ML
WBC # FLD AUTO: 7.15 K/UL

## 2024-12-23 ENCOUNTER — RX RENEWAL (OUTPATIENT)
Age: 44
End: 2024-12-23

## 2025-01-07 ENCOUNTER — RX RENEWAL (OUTPATIENT)
Age: 45
End: 2025-01-07

## 2025-01-13 ENCOUNTER — RX RENEWAL (OUTPATIENT)
Age: 45
End: 2025-01-13

## 2025-01-20 ENCOUNTER — RX RENEWAL (OUTPATIENT)
Age: 45
End: 2025-01-20

## 2025-02-11 ENCOUNTER — NON-APPOINTMENT (OUTPATIENT)
Age: 45
End: 2025-02-11

## 2025-03-17 ENCOUNTER — RX RENEWAL (OUTPATIENT)
Age: 45
End: 2025-03-17

## 2025-03-19 ENCOUNTER — RX RENEWAL (OUTPATIENT)
Age: 45
End: 2025-03-19

## 2025-03-27 ENCOUNTER — APPOINTMENT (OUTPATIENT)
Dept: AFTER HOURS CARE | Facility: EMERGENCY ROOM | Age: 45
End: 2025-03-27

## 2025-03-27 ENCOUNTER — TRANSCRIPTION ENCOUNTER (OUTPATIENT)
Age: 45
End: 2025-03-27

## 2025-03-27 ENCOUNTER — EMERGENCY (EMERGENCY)
Facility: HOSPITAL | Age: 45
LOS: 1 days | Discharge: HOME | End: 2025-03-27
Attending: EMERGENCY MEDICINE
Payer: COMMERCIAL

## 2025-03-27 VITALS
DIASTOLIC BLOOD PRESSURE: 106 MMHG | TEMPERATURE: 98 F | RESPIRATION RATE: 16 BRPM | OXYGEN SATURATION: 98 % | HEART RATE: 90 BPM | SYSTOLIC BLOOD PRESSURE: 148 MMHG

## 2025-03-27 VITALS
OXYGEN SATURATION: 96 % | HEIGHT: 69 IN | SYSTOLIC BLOOD PRESSURE: 155 MMHG | HEART RATE: 114 BPM | RESPIRATION RATE: 18 BRPM | DIASTOLIC BLOOD PRESSURE: 90 MMHG | TEMPERATURE: 98 F | WEIGHT: 220.02 LBS

## 2025-03-27 LAB
ALBUMIN SERPL ELPH-MCNC: 4.8 G/DL — SIGNIFICANT CHANGE UP (ref 3.3–5)
ALP SERPL-CCNC: 105 U/L — SIGNIFICANT CHANGE UP (ref 40–120)
ALT FLD-CCNC: 54 U/L — HIGH (ref 10–45)
ANION GAP SERPL CALC-SCNC: 20 MMOL/L — HIGH (ref 5–17)
AST SERPL-CCNC: 35 U/L — SIGNIFICANT CHANGE UP (ref 10–40)
BASOPHILS # BLD AUTO: 0.04 K/UL — SIGNIFICANT CHANGE UP (ref 0–0.2)
BASOPHILS NFR BLD AUTO: 0.5 % — SIGNIFICANT CHANGE UP (ref 0–2)
BILIRUB SERPL-MCNC: 0.9 MG/DL — SIGNIFICANT CHANGE UP (ref 0.2–1.2)
BUN SERPL-MCNC: 12 MG/DL — SIGNIFICANT CHANGE UP (ref 7–23)
CALCIUM SERPL-MCNC: 10.1 MG/DL — SIGNIFICANT CHANGE UP (ref 8.4–10.5)
CHLORIDE SERPL-SCNC: 103 MMOL/L — SIGNIFICANT CHANGE UP (ref 96–108)
CO2 SERPL-SCNC: 20 MMOL/L — LOW (ref 22–31)
CREAT SERPL-MCNC: 1.06 MG/DL — SIGNIFICANT CHANGE UP (ref 0.5–1.3)
EGFR: 89 ML/MIN/1.73M2 — SIGNIFICANT CHANGE UP
EGFR: 89 ML/MIN/1.73M2 — SIGNIFICANT CHANGE UP
EOSINOPHIL # BLD AUTO: 0.04 K/UL — SIGNIFICANT CHANGE UP (ref 0–0.5)
EOSINOPHIL NFR BLD AUTO: 0.5 % — SIGNIFICANT CHANGE UP (ref 0–6)
FLUAV AG NPH QL: SIGNIFICANT CHANGE UP
FLUBV AG NPH QL: SIGNIFICANT CHANGE UP
GLUCOSE SERPL-MCNC: 114 MG/DL — HIGH (ref 70–99)
HCT VFR BLD CALC: 48.6 % — SIGNIFICANT CHANGE UP (ref 39–50)
HGB BLD-MCNC: 18 G/DL — HIGH (ref 13–17)
IMM GRANULOCYTES NFR BLD AUTO: 0.2 % — SIGNIFICANT CHANGE UP (ref 0–0.9)
LYMPHOCYTES # BLD AUTO: 1.69 K/UL — SIGNIFICANT CHANGE UP (ref 1–3.3)
LYMPHOCYTES # BLD AUTO: 19.1 % — SIGNIFICANT CHANGE UP (ref 13–44)
MAGNESIUM SERPL-MCNC: 2.2 MG/DL — SIGNIFICANT CHANGE UP (ref 1.6–2.6)
MCHC RBC-ENTMCNC: 32.5 PG — SIGNIFICANT CHANGE UP (ref 27–34)
MCHC RBC-ENTMCNC: 37 G/DL — HIGH (ref 32–36)
MONOCYTES # BLD AUTO: 0.68 K/UL — SIGNIFICANT CHANGE UP (ref 0–0.9)
MONOCYTES NFR BLD AUTO: 7.7 % — SIGNIFICANT CHANGE UP (ref 2–14)
NEUTROPHILS # BLD AUTO: 6.37 K/UL — SIGNIFICANT CHANGE UP (ref 1.8–7.4)
NEUTROPHILS NFR BLD AUTO: 72 % — SIGNIFICANT CHANGE UP (ref 43–77)
NRBC BLD AUTO-RTO: 0 /100 WBCS — SIGNIFICANT CHANGE UP (ref 0–0)
NT-PROBNP SERPL-SCNC: <36 PG/ML — SIGNIFICANT CHANGE UP (ref 0–300)
PLATELET # BLD AUTO: 284 K/UL — SIGNIFICANT CHANGE UP (ref 150–400)
POTASSIUM SERPL-MCNC: 3.9 MMOL/L — SIGNIFICANT CHANGE UP (ref 3.5–5.3)
POTASSIUM SERPL-SCNC: 3.9 MMOL/L — SIGNIFICANT CHANGE UP (ref 3.5–5.3)
RBC # BLD: 5.54 M/UL — SIGNIFICANT CHANGE UP (ref 4.2–5.8)
RBC # FLD: 12.1 % — SIGNIFICANT CHANGE UP (ref 10.3–14.5)
RSV RNA NPH QL NAA+NON-PROBE: SIGNIFICANT CHANGE UP
SARS-COV-2 RNA SPEC QL NAA+PROBE: SIGNIFICANT CHANGE UP
SODIUM SERPL-SCNC: 143 MMOL/L — SIGNIFICANT CHANGE UP (ref 135–145)
SOURCE RESPIRATORY: SIGNIFICANT CHANGE UP
TROPONIN T, HIGH SENSITIVITY RESULT: <6 NG/L — SIGNIFICANT CHANGE UP (ref 0–51)
WBC # BLD: 8.84 K/UL — SIGNIFICANT CHANGE UP (ref 3.8–10.5)
WBC # FLD AUTO: 8.84 K/UL — SIGNIFICANT CHANGE UP (ref 3.8–10.5)

## 2025-03-27 PROCEDURE — 71045 X-RAY EXAM CHEST 1 VIEW: CPT | Mod: 26

## 2025-03-27 PROCEDURE — 80053 COMPREHEN METABOLIC PANEL: CPT

## 2025-03-27 PROCEDURE — 71045 X-RAY EXAM CHEST 1 VIEW: CPT

## 2025-03-27 PROCEDURE — 99284 EMERGENCY DEPT VISIT MOD MDM: CPT

## 2025-03-27 PROCEDURE — 83880 ASSAY OF NATRIURETIC PEPTIDE: CPT

## 2025-03-27 PROCEDURE — 93005 ELECTROCARDIOGRAM TRACING: CPT

## 2025-03-27 PROCEDURE — 87637 SARSCOV2&INF A&B&RSV AMP PRB: CPT

## 2025-03-27 PROCEDURE — 36415 COLL VENOUS BLD VENIPUNCTURE: CPT

## 2025-03-27 PROCEDURE — 99285 EMERGENCY DEPT VISIT HI MDM: CPT | Mod: 25

## 2025-03-27 PROCEDURE — 84484 ASSAY OF TROPONIN QUANT: CPT

## 2025-03-27 PROCEDURE — 93010 ELECTROCARDIOGRAM REPORT: CPT

## 2025-03-27 PROCEDURE — 99214 OFFICE O/P EST MOD 30 MIN: CPT | Mod: 95

## 2025-03-27 PROCEDURE — 83735 ASSAY OF MAGNESIUM: CPT

## 2025-03-27 PROCEDURE — 85025 COMPLETE CBC W/AUTO DIFF WBC: CPT

## 2025-03-27 RX ORDER — SODIUM CHLORIDE 9 G/1000ML
1000 INJECTION, SOLUTION INTRAVENOUS ONCE
Refills: 0 | Status: COMPLETED | OUTPATIENT
Start: 2025-03-27 | End: 2025-03-27

## 2025-03-27 RX ADMIN — SODIUM CHLORIDE 1000 MILLILITER(S): 9 INJECTION, SOLUTION INTRAVENOUS at 17:55

## 2025-03-27 NOTE — ED PROVIDER NOTE - OBJECTIVE STATEMENT
Attendin-year-old male with history of high blood pressure presents for feeling lightheaded for the past 2 to 3 days.  Patient states he has mild lightheadedness which has been occurring intermittently.  He notices the symptoms more when he goes from sitting to standing.  He feels more tired and fatigued overall.  No nausea or vomiting.  He states that he is tolerating p.o. well.  No fevers or chills.  No chest pain or shortness of breath.  He has not passed out but he feels lightheaded like he might pass out.

## 2025-03-27 NOTE — ED PROVIDER NOTE - RAPID ASSESSMENT
44-year-old male with history of hypertension presents to the ED for evaluation of dizziness over the last 2 days.  Patient reports dizziness as lightheaded sensation which occurs intermittently.  He also reports that he has had sensation of feeling more fatigued than his baseline as well.  Patient reports that he has been in touch with his doctor in regards to his blood pressure medications as he has had issues controlling it and was advised to increase meds.  He denies fevers, headache, vision changes, numbness, weakness, chest pain or shortness of breath    *Patient was rapidly assessed in triage by me and limited history was obtained. The patient will be seen, further examined and evaluated in the main ED where their care will be completed by the covering ED team. Receiving team will follow up on labs, medications, any clinical imaging, and perform reassessment and disposition of the patient as clinically indicated. All decisions regarding the progression of care will be made at their discretion. -Patito Estes PA-C*

## 2025-03-27 NOTE — ED PROVIDER NOTE - PATIENT PORTAL LINK FT
You can access the FollowMyHealth Patient Portal offered by [facility] by registering at the following website: [email or web address]. By joining [email or web address]’s FollowMyHealth portal, you will also be able to view your health information using other applications (apps) compatible with our system.

## 2025-03-27 NOTE — ED PROVIDER NOTE - CLINICAL SUMMARY MEDICAL DECISION MAKING FREE TEXT BOX
Lightheadedness for 2 days.  Symptoms seem to be worse when patient stands.  Symptoms are consistent with vasovagal syncope.  Will get an EKG to screen for arrhythmia or other cause of the patient's lightheadedness.  Will check a CBC to screen for anemia.  Will check a CMP to set screen for electrolyte abnormalities or other cause of lightheadedness.  Will give IV fluids and reassess.

## 2025-03-27 NOTE — ED PROVIDER NOTE - NSFOLLOWUPINSTRUCTIONS_ED_ALL_ED_FT
You were seen in the emergency department for lightheadedness.  You received a cardiac workup which was negative and your cardiac enzymes were within normal ranges your EKG was normal and your chest x-ray was normal.  We checked you for flu COVID RSV which was negative we checked your hemoglobin which was a little elevated at 18.  We would like for you to follow-up with your primary care provider as your blood pressure has been elevated in the emergency department which might be attributing to your symptoms.  We would like for you to record your blood pressure 3 times daily in the morning afternoon and evening for the next 4 to 5 days and present your blood pressure journal to your primary care provider for possible adjustment of your blood pressure     Hypertension    Hypertension, commonly called high blood pressure, is when the force of blood pumping through your arteries is too strong. Hypertension forces your heart to work harder to pump blood. Your arteries may become narrow or stiff. Having untreated or uncontrolled hypertension for a long period of time can cause heart attack, stroke, kidney disease, and other problems. If started on a medication, take exactly as prescribed by your health care professional. Maintain a healthy lifestyle and follow up with your primary care physician.    SEEK IMMEDIATE MEDICAL CARE IF YOU HAVE ANY OF THE FOLLOWING SYMPTOMS: severe headache, confusion, chest pain, abdominal pain, vomiting, or shortness of breath.

## 2025-03-27 NOTE — ED ADULT NURSE NOTE - OBJECTIVE STATEMENT
Pt is 44y M with PMH HTN on enalapril and amlodipine, complaining of dizziness. Pt reports onset of lightheadedness/dizziness x 2 days, intermittent in nature. Also reports increased fatigue and weakness than usual. Reports chest discomfort but unable to describe pain. Reports intermittent SOB, needing to take a deep breath after a few minutes. Reports taking additional dose of 5mg amlodipine this morning as per PCP recommendations. Denies current chest pain, SOB, abdominal pain, n/v/d, changes in urination or BM, changes in vision, photophobia. HTN, all other vitals WNL.

## 2025-03-27 NOTE — ED ADULT NURSE NOTE - NSFALLRISKINTERV_ED_ALL_ED
Assistance with ambulation/Communicate fall risk and risk factors to all staff, patient, and family/Encourage patient to sit up slowly, dangle for a short time, stand at bedside before walking/Monitor gait and stability/Orthostatic vital signs/Provide visual cue: yellow wristband, yellow gown, etc/Reinforce activity limits and safety measures with patient and family/Instruct patient to call for assistance before getting out of bed/chair/stretcher/Non-slip footwear applied when patient is off stretcher/[place] to call system/Physically safe environment - no spills, clutter or unnecessary equipment/Purposeful Proactive Rounding/Room/bathroom lighting operational, light cord in reach

## 2025-04-02 ENCOUNTER — APPOINTMENT (OUTPATIENT)
Dept: FAMILY MEDICINE | Facility: CLINIC | Age: 45
End: 2025-04-02
Payer: COMMERCIAL

## 2025-04-02 VITALS
OXYGEN SATURATION: 97 % | SYSTOLIC BLOOD PRESSURE: 144 MMHG | HEIGHT: 69 IN | BODY MASS INDEX: 32.88 KG/M2 | WEIGHT: 222 LBS | HEART RATE: 80 BPM | DIASTOLIC BLOOD PRESSURE: 91 MMHG | RESPIRATION RATE: 16 BRPM

## 2025-04-02 DIAGNOSIS — I10 ESSENTIAL (PRIMARY) HYPERTENSION: ICD-10-CM

## 2025-04-02 DIAGNOSIS — R45.89 OTHER SYMPTOMS AND SIGNS INVOLVING EMOTIONAL STATE: ICD-10-CM

## 2025-04-02 DIAGNOSIS — R00.2 PALPITATIONS: ICD-10-CM

## 2025-04-02 PROCEDURE — 99214 OFFICE O/P EST MOD 30 MIN: CPT

## 2025-04-03 PROBLEM — R45.89 ANXIETY ABOUT HEALTH: Status: ACTIVE | Noted: 2025-04-03

## 2025-04-10 ENCOUNTER — RX RENEWAL (OUTPATIENT)
Age: 45
End: 2025-04-10

## 2025-04-11 ENCOUNTER — RX RENEWAL (OUTPATIENT)
Age: 45
End: 2025-04-11

## 2025-04-14 ENCOUNTER — RX RENEWAL (OUTPATIENT)
Age: 45
End: 2025-04-14

## 2025-04-15 ENCOUNTER — APPOINTMENT (OUTPATIENT)
Dept: FAMILY MEDICINE | Facility: CLINIC | Age: 45
End: 2025-04-15
Payer: COMMERCIAL

## 2025-04-15 ENCOUNTER — RX RENEWAL (OUTPATIENT)
Age: 45
End: 2025-04-15

## 2025-04-15 VITALS — SYSTOLIC BLOOD PRESSURE: 130 MMHG | DIASTOLIC BLOOD PRESSURE: 82 MMHG

## 2025-04-15 VITALS
HEIGHT: 69 IN | TEMPERATURE: 97.1 F | SYSTOLIC BLOOD PRESSURE: 130 MMHG | DIASTOLIC BLOOD PRESSURE: 80 MMHG | WEIGHT: 230 LBS | RESPIRATION RATE: 16 BRPM | HEART RATE: 84 BPM | BODY MASS INDEX: 34.07 KG/M2 | OXYGEN SATURATION: 96 %

## 2025-04-15 DIAGNOSIS — E78.1 PURE HYPERGLYCERIDEMIA: ICD-10-CM

## 2025-04-15 PROCEDURE — 99213 OFFICE O/P EST LOW 20 MIN: CPT

## 2025-04-16 RX ORDER — AMLODIPINE BESYLATE 5 MG/1
5 TABLET ORAL
Qty: 30 | Refills: 2 | Status: ACTIVE | COMMUNITY
Start: 2025-04-15 | End: 1900-01-01

## 2025-04-23 ENCOUNTER — NON-APPOINTMENT (OUTPATIENT)
Age: 45
End: 2025-04-23

## 2025-04-23 ENCOUNTER — APPOINTMENT (OUTPATIENT)
Dept: CARDIOLOGY | Facility: CLINIC | Age: 45
End: 2025-04-23
Payer: COMMERCIAL

## 2025-04-23 VITALS
WEIGHT: 225 LBS | SYSTOLIC BLOOD PRESSURE: 132 MMHG | TEMPERATURE: 98.4 F | OXYGEN SATURATION: 97 % | DIASTOLIC BLOOD PRESSURE: 84 MMHG | RESPIRATION RATE: 15 BRPM | HEIGHT: 69 IN | HEART RATE: 84 BPM | BODY MASS INDEX: 33.33 KG/M2

## 2025-04-23 DIAGNOSIS — I10 ESSENTIAL (PRIMARY) HYPERTENSION: ICD-10-CM

## 2025-04-23 DIAGNOSIS — E78.00 PURE HYPERCHOLESTEROLEMIA, UNSPECIFIED: ICD-10-CM

## 2025-04-23 PROCEDURE — 93000 ELECTROCARDIOGRAM COMPLETE: CPT

## 2025-04-23 PROCEDURE — 99204 OFFICE O/P NEW MOD 45 MIN: CPT

## 2025-05-02 ENCOUNTER — RX RENEWAL (OUTPATIENT)
Age: 45
End: 2025-05-02

## 2025-06-09 ENCOUNTER — RX RENEWAL (OUTPATIENT)
Age: 45
End: 2025-06-09

## 2025-07-07 ENCOUNTER — RX RENEWAL (OUTPATIENT)
Age: 45
End: 2025-07-07